# Patient Record
Sex: MALE | Race: WHITE | Employment: FULL TIME | URBAN - METROPOLITAN AREA
[De-identification: names, ages, dates, MRNs, and addresses within clinical notes are randomized per-mention and may not be internally consistent; named-entity substitution may affect disease eponyms.]

---

## 2018-02-07 ENCOUNTER — OFFICE VISIT (OUTPATIENT)
Dept: ORTHOPEDIC SURGERY | Age: 28
End: 2018-02-07

## 2018-02-07 VITALS
HEIGHT: 69 IN | SYSTOLIC BLOOD PRESSURE: 131 MMHG | RESPIRATION RATE: 16 BRPM | DIASTOLIC BLOOD PRESSURE: 81 MMHG | WEIGHT: 160 LBS | HEART RATE: 71 BPM | BODY MASS INDEX: 23.7 KG/M2

## 2018-02-07 DIAGNOSIS — S62.211A CLOSED BENNETT'S FRACTURE OF RIGHT THUMB, INITIAL ENCOUNTER: Primary | ICD-10-CM

## 2018-02-07 PROCEDURE — 99243 OFF/OP CNSLTJ NEW/EST LOW 30: CPT | Performed by: ORTHOPAEDIC SURGERY
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
## 2018-02-09 ENCOUNTER — PAT TELEPHONE (OUTPATIENT)
Dept: PREADMISSION TESTING | Age: 28
End: 2018-02-09

## 2018-02-09 VITALS — WEIGHT: 160 LBS | BODY MASS INDEX: 23.7 KG/M2 | HEIGHT: 69 IN

## 2018-02-09 NOTE — PRE-PROCEDURE INSTRUCTIONS
day of surgery. All jewelry must be removed. If you have dentures, they will be removed before going to operating room. For your convenience, we will provide you with a container. If you wear contact lenses or glasses, they will be removed, please bring a case for them. If you have a living will and a durable power of  for healthcare, please bring in a copy. As part of our patient safety program to minimize surgical site infections, we ask you to do the following:    · Please notify your surgeon if you develop any illness between         now and the  day of your surgery. · This includes a cough, cold, fever, sore throat, nausea,         or vomiting, and diarrhea, etc.  ·  Please notify your surgeon if you experience dizziness, shortness         of breath or blurred vision between now and the time of your surgery. Do not shave your operative site 96 hours prior to surgery. For face and neck surgery, men may use an electric razor 48 hours   prior to surgery. You may shower the night before surgery or the morning of   your surgery with an antibacterial soap. You will need to bring a photo ID and insurance card    [facility] has an onsite pharmacy, would you like to utilize our pharmacy     If you will be staying overnight and use a C-pap machine, please bring   your C-pap to hospital     Our goal is to provide you with excellent care, therefore, visitors will be limited to two(2) in the room at a time so that we may focus on providing this care for you. Please contact pre-admission testing if you have any further questions. [facility] phone number:  [phone] [facility] fax number:  [phone]  Please note these are generalized instructions for all surgical cases, you may be provided with more specific instructions according to your surgery.
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
## 2018-02-13 ENCOUNTER — HOSPITAL ENCOUNTER (OUTPATIENT)
Dept: SURGERY | Age: 28
Discharge: HOME | End: 2018-02-13
Attending: ORTHOPAEDIC SURGERY | Admitting: ORTHOPAEDIC SURGERY

## 2018-02-13 VITALS
SYSTOLIC BLOOD PRESSURE: 110 MMHG | RESPIRATION RATE: 16 BRPM | DIASTOLIC BLOOD PRESSURE: 57 MMHG | HEIGHT: 69 IN | WEIGHT: 160 LBS | BODY MASS INDEX: 23.7 KG/M2 | OXYGEN SATURATION: 97 % | HEART RATE: 56 BPM | TEMPERATURE: 97 F

## 2018-02-13 DIAGNOSIS — S62.211D CLOSED BENNETT'S FRACTURE OF RIGHT THUMB WITH ROUTINE HEALING, SUBSEQUENT ENCOUNTER: Primary | ICD-10-CM

## 2018-02-13 RX ORDER — FENTANYL CITRATE 50 UG/ML
25 INJECTION, SOLUTION INTRAMUSCULAR; INTRAVENOUS EVERY 5 MIN PRN
Status: DISCONTINUED | OUTPATIENT
Start: 2018-02-13 | End: 2018-02-14 | Stop reason: HOSPADM

## 2018-02-13 RX ORDER — OXYCODONE HYDROCHLORIDE AND ACETAMINOPHEN 5; 325 MG/1; MG/1
2 TABLET ORAL PRN
Status: COMPLETED | OUTPATIENT
Start: 2018-02-13 | End: 2018-02-13

## 2018-02-13 RX ORDER — MORPHINE SULFATE 2 MG/ML
2 INJECTION, SOLUTION INTRAMUSCULAR; INTRAVENOUS EVERY 5 MIN PRN
Status: DISCONTINUED | OUTPATIENT
Start: 2018-02-13 | End: 2018-02-14 | Stop reason: HOSPADM

## 2018-02-13 RX ORDER — FENTANYL CITRATE 50 UG/ML
50 INJECTION, SOLUTION INTRAMUSCULAR; INTRAVENOUS EVERY 5 MIN PRN
Status: DISCONTINUED | OUTPATIENT
Start: 2018-02-13 | End: 2018-02-14 | Stop reason: HOSPADM

## 2018-02-13 RX ORDER — MORPHINE SULFATE 2 MG/ML
1 INJECTION, SOLUTION INTRAMUSCULAR; INTRAVENOUS EVERY 5 MIN PRN
Status: DISCONTINUED | OUTPATIENT
Start: 2018-02-13 | End: 2018-02-14 | Stop reason: HOSPADM

## 2018-02-13 RX ORDER — HYDROCODONE BITARTRATE AND ACETAMINOPHEN 5; 325 MG/1; MG/1
1 TABLET ORAL EVERY 6 HOURS PRN
Qty: 20 TABLET | Refills: 0 | Status: SHIPPED | OUTPATIENT
Start: 2018-02-13 | End: 2018-02-20

## 2018-02-13 RX ORDER — OXYCODONE HYDROCHLORIDE AND ACETAMINOPHEN 5; 325 MG/1; MG/1
1 TABLET ORAL PRN
Status: COMPLETED | OUTPATIENT
Start: 2018-02-13 | End: 2018-02-13

## 2018-02-13 RX ORDER — MEPERIDINE HYDROCHLORIDE 25 MG/ML
12.5 INJECTION INTRAMUSCULAR; INTRAVENOUS; SUBCUTANEOUS EVERY 5 MIN PRN
Status: DISCONTINUED | OUTPATIENT
Start: 2018-02-13 | End: 2018-02-14 | Stop reason: HOSPADM

## 2018-02-13 RX ORDER — ONDANSETRON 2 MG/ML
4 INJECTION INTRAMUSCULAR; INTRAVENOUS
Status: ACTIVE | OUTPATIENT
Start: 2018-02-13 | End: 2018-02-13

## 2018-02-13 RX ORDER — SODIUM CHLORIDE 9 MG/ML
INJECTION, SOLUTION INTRAVENOUS CONTINUOUS
Status: DISCONTINUED | OUTPATIENT
Start: 2018-02-13 | End: 2018-02-14 | Stop reason: HOSPADM

## 2018-02-13 RX ORDER — SODIUM CHLORIDE 0.9 % (FLUSH) 0.9 %
10 SYRINGE (ML) INJECTION EVERY 12 HOURS SCHEDULED
Status: DISCONTINUED | OUTPATIENT
Start: 2018-02-13 | End: 2018-02-14 | Stop reason: HOSPADM

## 2018-02-13 RX ORDER — SODIUM CHLORIDE 0.9 % (FLUSH) 0.9 %
10 SYRINGE (ML) INJECTION PRN
Status: DISCONTINUED | OUTPATIENT
Start: 2018-02-13 | End: 2018-02-14 | Stop reason: HOSPADM

## 2018-02-13 RX ADMIN — SODIUM CHLORIDE: 9 INJECTION, SOLUTION INTRAVENOUS at 13:20

## 2018-02-13 RX ADMIN — FENTANYL CITRATE 25 MCG: 50 INJECTION, SOLUTION INTRAMUSCULAR; INTRAVENOUS at 15:26

## 2018-02-13 RX ADMIN — OXYCODONE HYDROCHLORIDE AND ACETAMINOPHEN 1 TABLET: 5; 325 TABLET ORAL at 16:24

## 2018-02-13 ASSESSMENT — PAIN DESCRIPTION - LOCATION
LOCATION: HAND
LOCATION: HAND

## 2018-02-13 ASSESSMENT — PAIN - FUNCTIONAL ASSESSMENT: PAIN_FUNCTIONAL_ASSESSMENT: 0-10

## 2018-02-13 ASSESSMENT — PAIN DESCRIPTION - ORIENTATION
ORIENTATION: RIGHT
ORIENTATION: RIGHT

## 2018-02-13 ASSESSMENT — PAIN SCALES - GENERAL
PAINLEVEL_OUTOF10: 8
PAINLEVEL_OUTOF10: 5
PAINLEVEL_OUTOF10: 8
PAINLEVEL_OUTOF10: 0
PAINLEVEL_OUTOF10: 6

## 2018-02-13 ASSESSMENT — PAIN DESCRIPTION - PAIN TYPE
TYPE: SURGICAL PAIN
TYPE: SURGICAL PAIN

## 2018-02-13 ASSESSMENT — LIFESTYLE VARIABLES: SMOKING_STATUS: 0

## 2018-02-13 NOTE — PROGRESS NOTES
Patient arouses to name, oral airway removed. Resp easy unlabored. VSS. IV patent. Right hand/thumb dressing unchanged. Patient drowsy and goes asleep easily.

## 2018-02-13 NOTE — PROGRESS NOTES
Patient awake and alert. VSS. Resp easy unlabored on room air O2 with SaO2 97%. Monitor in SB. Right hand/thumb dressing dry and intact with stable neurovascular checks to RUE. Patient denies C/O nausea and taking ice chips prn. Pain level 4-5 of 10 and tolerable. Patient stable to transfer to ACU for phase II. HOB up. Moving all extremities to command.

## 2018-02-13 NOTE — PROGRESS NOTES
Alert and oriented. C/o 8/10 surgical pain. Dressing remains clean dry intact. Fingers warm, starting to move, christelle well. Tolerated sitting up and po fluids and crackers well. Family at bedside. Understands discharge instructions.

## 2018-02-13 NOTE — ANESTHESIA PRE-OP
risks discussed with patient. Plan discussed with CRNA. This pre-anesthesia assessment may be used as a history and physical.    DOS STAFF ADDENDUM:    Pt seen and examined, chart reviewed (including anesthesia, drug and allergy history). No interval changes to history and physical examination. Anesthetic plan, risks, benefits, alternatives, and personnel involved discussed with patient. Patient verbalized an understanding and agrees to proceed.       Melinda Colby MD  February 13, 2018  12:46 PM      Melinda Colby MD   2/13/2018

## 2018-02-14 ENCOUNTER — TELEPHONE (OUTPATIENT)
Dept: ORTHOPEDIC SURGERY | Age: 28
End: 2018-02-14

## 2018-02-19 ENCOUNTER — OFFICE VISIT (OUTPATIENT)
Dept: ORTHOPEDIC SURGERY | Age: 28
End: 2018-02-19

## 2018-02-19 VITALS — WEIGHT: 160 LBS | BODY MASS INDEX: 23.7 KG/M2 | HEIGHT: 69 IN | RESPIRATION RATE: 16 BRPM

## 2018-02-19 DIAGNOSIS — S62.211D: ICD-10-CM

## 2018-02-19 DIAGNOSIS — M79.641 RIGHT HAND PAIN: Primary | ICD-10-CM

## 2018-02-19 DIAGNOSIS — S62.211D CLOSED BENNETT'S FRACTURE OF RIGHT THUMB WITH ROUTINE HEALING, SUBSEQUENT ENCOUNTER: ICD-10-CM

## 2018-02-19 PROCEDURE — 99024 POSTOP FOLLOW-UP VISIT: CPT | Performed by: PHYSICIAN ASSISTANT

## 2018-02-19 PROCEDURE — 29075 APPL CST ELBW FNGR SHORT ARM: CPT | Performed by: PHYSICIAN ASSISTANT

## 2018-02-19 NOTE — PROGRESS NOTES
Darcie Gaffney MD. ordered a spica cast  to be applied to [facility] right upper Hand. I applied a spica cast cast to [facility] right upper Hand. I applied 1 rolls of under padding in a 50/50 fashion. The Rhina Webstery requested black color fiberglass. I rolled 2 rolls of fiberglass in a 50/50 fashion. His right upper Hand is in a thumb spica position short arm fiberglass cast incorporating the Thumb in an intrinsic safe position Keren Rosas MD .  Cole Coombs's nail beds are pink in color, the extremity is warm to the touch. Capillary refill is less than 2 seconds. Rhina Webstery instructed on proper care of cast.  Do not get wet, keep all items out of cast.  If cast is painful please make appointment to get checked. Patient also briefed on circulation compromise. If digits are cold, blue, and tingling patient must must seek care. If after hours patient is to go to Emergency Room. During office hours patient must come in to office.

## 2018-02-21 ENCOUNTER — TELEPHONE (OUTPATIENT)
Dept: ORTHOPEDIC SURGERY | Age: 28
End: 2018-02-21

## 2018-03-12 ENCOUNTER — OFFICE VISIT (OUTPATIENT)
Dept: ORTHOPEDIC SURGERY | Age: 28
End: 2018-03-12

## 2018-03-12 VITALS — RESPIRATION RATE: 16 BRPM | BODY MASS INDEX: 23.7 KG/M2 | WEIGHT: 160 LBS | HEIGHT: 69 IN

## 2018-03-12 DIAGNOSIS — S62.211D: ICD-10-CM

## 2018-03-12 DIAGNOSIS — M79.641 RIGHT HAND PAIN: Primary | ICD-10-CM

## 2018-03-12 PROCEDURE — 99024 POSTOP FOLLOW-UP VISIT: CPT | Performed by: PHYSICIAN ASSISTANT

## 2018-03-12 NOTE — PROGRESS NOTES
Mr. Tonio March returns today in follow-up of his recent right Thumb Metacarpal  Fracture Repair which was done 4 weeks ago. He has noted decreased discomfort and decreased swelling. He notes no symptoms of numbness, tingling, no symptoms related to perfusion. Physical Exam:  Skin incisions & pin sites are healing well, no significant drainage, no dehiscence. Digital range of motion is not assessed due to the presence of the recently-healed fracture. Wrist range of motion is somewhat stiff from immobilization. Sensation is normal in the Whole Hand. Vascular examination reveals normal, good capillary refill and good color. Swelling is minimal.  Clinical alignment and rotation are normal.  Fracture site shows no tenderness to palpation. Radiographic Evaluation:  Radiographs were obtained today (3 views of the right hand). They demonstrate excellent maintenance of alignment of the fracture fragments, no rotational deformity, & moderate amount of interval healing of the fracture. There has not been evidence of hardware loosening or failure. The fracture does appear to be healed at this time. Impression:  Mr. Tonio March is doing well after recent right Thumb Metacarpal Fracture Repair. It would appear that he has fully healed his fracture. Plan:  Mr. Tonio March has healed his fracture at this time. His Pecutaneous Pins were removed today without difficulty. With his consent, the area surrounding the pins was sterily prepped and the pins were carefully removed. There was minimal bleeding which was easily controlled. A dry sterile dressing was applied, He tolerated the procedure without difficulty. Mr. Tonio March was given instructions regarding pin site care and maintenance. He is today is released from his immobilization & provided a protective splint and instructions on the appropriate care of, wear, and use of this device.    He is also instructed in  work on Active & Passive range of motion of the digits, wrist, & elbow. These modalities were demonstrated to him today. We discussed the gradual return to use of the injured hand & wrist and the cautious resumption of activities. We discussed the option of pursuing formalized hand therapy and a prescription  was not indicated. I have asked Mr. Elysia Del Real to follow-up with me in approximately 4 weeks from now for re-evaluation, unless he has regained full and painless range of motion and use of the injured hand. He is also specifically instructed to return to the office or call for an appointment sooner if his symptoms are changing or worsening prior to that time.

## 2018-03-28 ENCOUNTER — OFFICE VISIT (OUTPATIENT)
Dept: ORTHOPEDIC SURGERY | Age: 28
End: 2018-03-28

## 2018-03-28 VITALS — BODY MASS INDEX: 23.7 KG/M2 | WEIGHT: 160 LBS | HEIGHT: 69 IN | RESPIRATION RATE: 16 BRPM

## 2018-03-28 DIAGNOSIS — S62.211D: Primary | ICD-10-CM

## 2018-03-28 PROCEDURE — 99024 POSTOP FOLLOW-UP VISIT: CPT | Performed by: PHYSICIAN ASSISTANT

## 2018-04-11 ENCOUNTER — TELEPHONE (OUTPATIENT)
Dept: ORTHOPEDIC SURGERY | Age: 28
End: 2018-04-11

## 2018-04-11 ENCOUNTER — HOSPITAL ENCOUNTER (OUTPATIENT)
Dept: OCCUPATIONAL THERAPY | Age: 28
Discharge: HOME | End: 2018-04-30
Attending: PHYSICIAN ASSISTANT | Admitting: PHYSICIAN ASSISTANT

## 2018-05-01 ENCOUNTER — HOSPITAL ENCOUNTER (OUTPATIENT)
Dept: OTHER | Age: 28
Discharge: HOME | End: 2018-05-31
Attending: PHYSICIAN ASSISTANT | Admitting: PHYSICIAN ASSISTANT

## 2018-06-18 ENCOUNTER — OFFICE VISIT (OUTPATIENT)
Dept: ORTHOPEDIC SURGERY | Age: 28
End: 2018-06-18

## 2018-06-18 VITALS
HEART RATE: 71 BPM | BODY MASS INDEX: 23.7 KG/M2 | HEIGHT: 69 IN | RESPIRATION RATE: 16 BRPM | SYSTOLIC BLOOD PRESSURE: 122 MMHG | DIASTOLIC BLOOD PRESSURE: 69 MMHG | WEIGHT: 160 LBS

## 2018-06-18 DIAGNOSIS — S62.211D: Primary | ICD-10-CM

## 2018-06-18 PROCEDURE — 99213 OFFICE O/P EST LOW 20 MIN: CPT | Performed by: ORTHOPAEDIC SURGERY

## 2019-01-14 ENCOUNTER — OFFICE VISIT (OUTPATIENT)
Dept: INTERNAL MEDICINE CLINIC | Age: 29
End: 2019-01-14
Payer: COMMERCIAL

## 2019-01-14 VITALS
WEIGHT: 171.2 LBS | BODY MASS INDEX: 33.61 KG/M2 | DIASTOLIC BLOOD PRESSURE: 68 MMHG | SYSTOLIC BLOOD PRESSURE: 124 MMHG | RESPIRATION RATE: 10 BRPM | HEIGHT: 60 IN | HEART RATE: 82 BPM

## 2019-01-14 DIAGNOSIS — R53.83 OTHER FATIGUE: ICD-10-CM

## 2019-01-14 DIAGNOSIS — Z92.241 HISTORY OF ANABOLIC STEROID USE: ICD-10-CM

## 2019-01-14 DIAGNOSIS — N62 GYNECOMASTIA: Primary | ICD-10-CM

## 2019-01-14 DIAGNOSIS — N50.89 MASS OF LEFT TESTICLE: ICD-10-CM

## 2019-01-14 PROCEDURE — 99203 OFFICE O/P NEW LOW 30 MIN: CPT | Performed by: INTERNAL MEDICINE

## 2019-01-14 ASSESSMENT — PATIENT HEALTH QUESTIONNAIRE - PHQ9
2. FEELING DOWN, DEPRESSED OR HOPELESS: 0
SUM OF ALL RESPONSES TO PHQ9 QUESTIONS 1 & 2: 0
1. LITTLE INTEREST OR PLEASURE IN DOING THINGS: 0
SUM OF ALL RESPONSES TO PHQ QUESTIONS 1-9: 0
2. FEELING DOWN, DEPRESSED OR HOPELESS: 0
SUM OF ALL RESPONSES TO PHQ QUESTIONS 1-9: 0

## 2019-01-14 ASSESSMENT — ENCOUNTER SYMPTOMS
DIARRHEA: 0
CHEST TIGHTNESS: 0
SORE THROAT: 0
CONSTIPATION: 0
SHORTNESS OF BREATH: 0

## 2019-01-24 ENCOUNTER — HOSPITAL ENCOUNTER (OUTPATIENT)
Age: 29
Discharge: HOME | End: 2019-01-24
Payer: COMMERCIAL

## 2019-01-24 DIAGNOSIS — Z92.241 HISTORY OF ANABOLIC STEROID USE: ICD-10-CM

## 2019-01-24 LAB
A/G RATIO: 1.9 (ref 1.1–2.2)
ALBUMIN SERPL-MCNC: 4.7 G/DL (ref 3.4–5)
ALP BLD-CCNC: 50 U/L (ref 40–129)
ALT SERPL-CCNC: 27 U/L (ref 10–40)
ANION GAP SERPL CALCULATED.3IONS-SCNC: 15 MMOL/L (ref 3–16)
AST SERPL-CCNC: 34 U/L (ref 15–37)
BASOPHILS ABSOLUTE: 0.1 K/UL (ref 0–0.2)
BASOPHILS RELATIVE PERCENT: 0.9 %
BILIRUB SERPL-MCNC: 1.2 MG/DL (ref 0–1)
BUN BLDV-MCNC: 27 MG/DL (ref 7–20)
CALCIUM SERPL-MCNC: 9.8 MG/DL (ref 8.3–10.6)
CHLORIDE BLD-SCNC: 102 MMOL/L (ref 99–110)
CHOLESTEROL, TOTAL: 152 MG/DL (ref 0–199)
CO2: 24 MMOL/L (ref 21–32)
CREAT SERPL-MCNC: 1.2 MG/DL (ref 0.9–1.3)
EOSINOPHILS ABSOLUTE: 0.7 K/UL (ref 0–0.6)
EOSINOPHILS RELATIVE PERCENT: 9.1 %
FOLLICLE STIMULATING HORMONE: 17.5 MIU/ML
GFR AFRICAN AMERICAN: >60
GFR NON-AFRICAN AMERICAN: >60
GLOBULIN: 2.5 G/DL
GLUCOSE BLD-MCNC: 90 MG/DL (ref 70–99)
HCT VFR BLD CALC: 41.6 % (ref 40.5–52.5)
HDLC SERPL-MCNC: 47 MG/DL (ref 40–60)
HEMOGLOBIN: 14 G/DL (ref 13.5–17.5)
LDL CHOLESTEROL CALCULATED: 92 MG/DL
LUTEINIZING HORMONE: 5.9 MIU/ML
LYMPHOCYTES ABSOLUTE: 3 K/UL (ref 1–5.1)
LYMPHOCYTES RELATIVE PERCENT: 41.9 %
MCH RBC QN AUTO: 31.2 PG (ref 26–34)
MCHC RBC AUTO-ENTMCNC: 33.6 G/DL (ref 31–36)
MCV RBC AUTO: 92.8 FL (ref 80–100)
MONOCYTES ABSOLUTE: 0.6 K/UL (ref 0–1.3)
MONOCYTES RELATIVE PERCENT: 8.1 %
NEUTROPHILS ABSOLUTE: 2.9 K/UL (ref 1.7–7.7)
NEUTROPHILS RELATIVE PERCENT: 40 %
PDW BLD-RTO: 13.7 % (ref 12.4–15.4)
PLATELET # BLD: 312 K/UL (ref 135–450)
PMV BLD AUTO: 8.2 FL (ref 5–10.5)
POTASSIUM SERPL-SCNC: 4.2 MMOL/L (ref 3.5–5.1)
RBC # BLD: 4.48 M/UL (ref 4.2–5.9)
SODIUM BLD-SCNC: 141 MMOL/L (ref 136–145)
TOTAL PROTEIN: 7.2 G/DL (ref 6.4–8.2)
TRIGL SERPL-MCNC: 63 MG/DL (ref 0–150)
TSH REFLEX: 3.5 UIU/ML (ref 0.27–4.2)
VLDLC SERPL CALC-MCNC: 13 MG/DL
WBC # BLD: 7.2 K/UL (ref 4–11)

## 2019-01-24 PROCEDURE — 80053 COMPREHEN METABOLIC PANEL: CPT

## 2019-01-24 PROCEDURE — 84403 ASSAY OF TOTAL TESTOSTERONE: CPT

## 2019-01-24 PROCEDURE — 36415 COLL VENOUS BLD VENIPUNCTURE: CPT

## 2019-01-24 PROCEDURE — 84443 ASSAY THYROID STIM HORMONE: CPT

## 2019-01-24 PROCEDURE — 83001 ASSAY OF GONADOTROPIN (FSH): CPT

## 2019-01-24 PROCEDURE — 85025 COMPLETE CBC W/AUTO DIFF WBC: CPT

## 2019-01-24 PROCEDURE — 80061 LIPID PANEL: CPT

## 2019-01-24 PROCEDURE — 83002 ASSAY OF GONADOTROPIN (LH): CPT

## 2019-01-24 PROCEDURE — 84270 ASSAY OF SEX HORMONE GLOBUL: CPT

## 2019-01-29 ENCOUNTER — HOSPITAL ENCOUNTER (OUTPATIENT)
Dept: ULTRASOUND IMAGING | Age: 29
Discharge: HOME | End: 2019-01-29
Payer: COMMERCIAL

## 2019-01-29 ENCOUNTER — HOSPITAL ENCOUNTER (OUTPATIENT)
Dept: WOMENS IMAGING | Age: 29
Discharge: HOME | End: 2019-01-29
Payer: COMMERCIAL

## 2019-01-29 DIAGNOSIS — Z92.241 HISTORY OF ANABOLIC STEROID USE: ICD-10-CM

## 2019-01-29 DIAGNOSIS — N62 GYNECOMASTIA: ICD-10-CM

## 2019-01-29 DIAGNOSIS — N50.89 MASS OF LEFT TESTICLE: ICD-10-CM

## 2019-01-29 LAB
SEX HORMONE BINDING GLOBULIN: 53 NMOL/L (ref 11–80)
TESTOSTERONE FREE-NONMALE: 87.2 PG/ML (ref 47–244)
TESTOSTERONE TOTAL: 559 NG/DL (ref 220–1000)

## 2019-01-29 PROCEDURE — G0279 TOMOSYNTHESIS, MAMMO: HCPCS

## 2019-01-29 PROCEDURE — 76642 ULTRASOUND BREAST LIMITED: CPT

## 2019-01-29 PROCEDURE — 76870 US EXAM SCROTUM: CPT

## 2019-05-29 ENCOUNTER — APPOINTMENT (OUTPATIENT)
Dept: GENERAL RADIOLOGY | Age: 29
End: 2019-05-29
Payer: COMMERCIAL

## 2019-05-29 ENCOUNTER — OFFICE VISIT (OUTPATIENT)
Dept: ORTHOPEDIC SURGERY | Age: 29
End: 2019-05-29
Payer: COMMERCIAL

## 2019-05-29 ENCOUNTER — HOSPITAL ENCOUNTER (EMERGENCY)
Age: 29
Discharge: HOME | End: 2019-05-29
Attending: EMERGENCY MEDICINE
Payer: COMMERCIAL

## 2019-05-29 VITALS
DIASTOLIC BLOOD PRESSURE: 72 MMHG | WEIGHT: 160 LBS | RESPIRATION RATE: 16 BRPM | HEART RATE: 68 BPM | BODY MASS INDEX: 23.7 KG/M2 | SYSTOLIC BLOOD PRESSURE: 123 MMHG | HEIGHT: 69 IN

## 2019-05-29 VITALS
HEART RATE: 68 BPM | OXYGEN SATURATION: 98 % | BODY MASS INDEX: 25.18 KG/M2 | DIASTOLIC BLOOD PRESSURE: 43 MMHG | HEIGHT: 69 IN | WEIGHT: 170 LBS | TEMPERATURE: 99 F | SYSTOLIC BLOOD PRESSURE: 131 MMHG | RESPIRATION RATE: 16 BRPM

## 2019-05-29 DIAGNOSIS — M79.641 HAND PAIN, RIGHT: Primary | ICD-10-CM

## 2019-05-29 DIAGNOSIS — S62.319A FRACTURE OF METACARPAL BASE OF RIGHT HAND, CLOSED, INITIAL ENCOUNTER: ICD-10-CM

## 2019-05-29 DIAGNOSIS — R07.89 POSTERIOR CHEST PAIN: ICD-10-CM

## 2019-05-29 DIAGNOSIS — R07.89 LEFT-SIDED CHEST WALL PAIN: Primary | ICD-10-CM

## 2019-05-29 LAB
A/G RATIO: 1.8 (ref 1.1–2.2)
ALBUMIN SERPL-MCNC: 4.8 G/DL (ref 3.4–5)
ALP BLD-CCNC: 65 U/L (ref 40–129)
ALT SERPL-CCNC: 26 U/L (ref 10–40)
ANION GAP SERPL CALCULATED.3IONS-SCNC: 9 MMOL/L (ref 3–16)
AST SERPL-CCNC: 30 U/L (ref 15–37)
BASOPHILS ABSOLUTE: 0.1 K/UL (ref 0–0.2)
BASOPHILS RELATIVE PERCENT: 0.8 %
BILIRUB SERPL-MCNC: 0.4 MG/DL (ref 0–1)
BUN BLDV-MCNC: 30 MG/DL (ref 7–20)
CALCIUM SERPL-MCNC: 10 MG/DL (ref 8.3–10.6)
CHLORIDE BLD-SCNC: 101 MMOL/L (ref 99–110)
CO2: 29 MMOL/L (ref 21–32)
CREAT SERPL-MCNC: 1.3 MG/DL (ref 0.9–1.3)
EOSINOPHILS ABSOLUTE: 0.4 K/UL (ref 0–0.6)
EOSINOPHILS RELATIVE PERCENT: 4.8 %
GFR AFRICAN AMERICAN: >60
GFR NON-AFRICAN AMERICAN: >60
GLOBULIN: 2.7 G/DL
GLUCOSE BLD-MCNC: 89 MG/DL (ref 70–99)
HCT VFR BLD CALC: 47.1 % (ref 40.5–52.5)
HEMOGLOBIN: 16.1 G/DL (ref 13.5–17.5)
LYMPHOCYTES ABSOLUTE: 2 K/UL (ref 1–5.1)
LYMPHOCYTES RELATIVE PERCENT: 23 %
MCH RBC QN AUTO: 30.8 PG (ref 26–34)
MCHC RBC AUTO-ENTMCNC: 34.2 G/DL (ref 31–36)
MCV RBC AUTO: 90 FL (ref 80–100)
MONOCYTES ABSOLUTE: 0.6 K/UL (ref 0–1.3)
MONOCYTES RELATIVE PERCENT: 6.3 %
NEUTROPHILS ABSOLUTE: 5.7 K/UL (ref 1.7–7.7)
NEUTROPHILS RELATIVE PERCENT: 65.1 %
PDW BLD-RTO: 13.3 % (ref 12.4–15.4)
PLATELET # BLD: 269 K/UL (ref 135–450)
PMV BLD AUTO: 7.6 FL (ref 5–10.5)
POTASSIUM SERPL-SCNC: 3.9 MMOL/L (ref 3.5–5.1)
RBC # BLD: 5.24 M/UL (ref 4.2–5.9)
SODIUM BLD-SCNC: 139 MMOL/L (ref 136–145)
TOTAL PROTEIN: 7.5 G/DL (ref 6.4–8.2)
WBC # BLD: 8.8 K/UL (ref 4–11)

## 2019-05-29 PROCEDURE — 71046 X-RAY EXAM CHEST 2 VIEWS: CPT

## 2019-05-29 PROCEDURE — 80053 COMPREHEN METABOLIC PANEL: CPT

## 2019-05-29 PROCEDURE — 1036F TOBACCO NON-USER: CPT | Performed by: ORTHOPAEDIC SURGERY

## 2019-05-29 PROCEDURE — G8427 DOCREV CUR MEDS BY ELIG CLIN: HCPCS | Performed by: ORTHOPAEDIC SURGERY

## 2019-05-29 PROCEDURE — G8417 CALC BMI ABV UP PARAM F/U: HCPCS | Performed by: ORTHOPAEDIC SURGERY

## 2019-05-29 PROCEDURE — 99214 OFFICE O/P EST MOD 30 MIN: CPT | Performed by: ORTHOPAEDIC SURGERY

## 2019-05-29 PROCEDURE — 85025 COMPLETE CBC W/AUTO DIFF WBC: CPT

## 2019-05-29 PROCEDURE — 26600 TREAT METACARPAL FRACTURE: CPT | Performed by: ORTHOPAEDIC SURGERY

## 2019-05-29 PROCEDURE — L3908 WHO COCK-UP NONMOLDE PRE OTS: HCPCS | Performed by: ORTHOPAEDIC SURGERY

## 2019-05-29 PROCEDURE — 6370000000 HC RX 637 (ALT 250 FOR IP): Performed by: EMERGENCY MEDICINE

## 2019-05-29 PROCEDURE — 93005 ELECTROCARDIOGRAM TRACING: CPT | Performed by: EMERGENCY MEDICINE

## 2019-05-29 PROCEDURE — 99283 EMERGENCY DEPT VISIT LOW MDM: CPT

## 2019-05-29 RX ORDER — CYCLOBENZAPRINE HCL 10 MG
10 TABLET ORAL ONCE
Status: COMPLETED | OUTPATIENT
Start: 2019-05-29 | End: 2019-05-29

## 2019-05-29 RX ORDER — HYDROCODONE BITARTRATE AND ACETAMINOPHEN 5; 325 MG/1; MG/1
1 TABLET ORAL EVERY 6 HOURS PRN
Qty: 12 TABLET | Refills: 0 | Status: SHIPPED | OUTPATIENT
Start: 2019-05-29 | End: 2019-06-01

## 2019-05-29 RX ORDER — METHOCARBAMOL 750 MG/1
750-1500 TABLET, FILM COATED ORAL EVERY 8 HOURS PRN
Qty: 40 TABLET | Refills: 0 | Status: SHIPPED | OUTPATIENT
Start: 2019-05-29 | End: 2019-06-08

## 2019-05-29 RX ORDER — HYDROCODONE BITARTRATE AND ACETAMINOPHEN 5; 325 MG/1; MG/1
1 TABLET ORAL ONCE
Status: COMPLETED | OUTPATIENT
Start: 2019-05-29 | End: 2019-05-29

## 2019-05-29 RX ADMIN — HYDROCODONE BITARTRATE AND ACETAMINOPHEN 1 TABLET: 5; 325 TABLET ORAL at 23:01

## 2019-05-29 RX ADMIN — CYCLOBENZAPRINE HYDROCHLORIDE 10 MG: 10 TABLET, FILM COATED ORAL at 23:01

## 2019-05-29 ASSESSMENT — PAIN SCALES - GENERAL: PAINLEVEL_OUTOF10: 7

## 2019-05-29 NOTE — LETTER
[address] [employer] [address]  Phone: [phone]  Fax: [phone]    Princess Babinski, MD        May 29, 2019     Patient: Sada Escudero   YOB: 1990   Date of Visit: 5/29/2019       To Whom it May Concern:    Sada Escudero was seen in my clinic on 5/29/2019. If you have any questions or concerns, please don't hesitate to call.     Sincerely,               Princess Babinski, MD

## 2019-05-29 NOTE — Clinical Note
Dear  Xiao Hallman, DO,Thank you very much for your referral or Mr. Luis M Nava to me for evaluation and treatment of his Hand & Wrist condition. I appreciate your confidence in me and thank you for allowing me the opportunity to care for your patients. If I can be of any further assistance to you on this or any other patient, please do not hesitate to contact me. Sincerely,Aron Martinez MD

## 2019-05-30 LAB
EKG ATRIAL RATE: 67 BPM
EKG DIAGNOSIS: NORMAL
EKG P AXIS: 63 DEGREES
EKG P-R INTERVAL: 128 MS
EKG Q-T INTERVAL: 380 MS
EKG QRS DURATION: 90 MS
EKG QTC CALCULATION (BAZETT): 401 MS
EKG R AXIS: 86 DEGREES
EKG T AXIS: 42 DEGREES
EKG VENTRICULAR RATE: 67 BPM

## 2019-05-30 PROCEDURE — 93010 ELECTROCARDIOGRAM REPORT: CPT | Performed by: INTERNAL MEDICINE

## 2019-05-30 NOTE — PROGRESS NOTES
radius & ulna are not tender to palpation. There is a 0 degree angular deformity and no clinical evidence of  mal-rotation. There is no instability, pain or crepitance at the site of prior treatment for right Thumb metacarpal base fracture from 2/2018    Radiographic Evaluation:  Radiographs are reviewed  today (3 views of the right hand). They demonstrate evidence of an acute fracture of the proximal  Index Finger Metacarpal  with no comminution, 0 degrees of angular deformity and no  mal-rotation. There is not evidence of other injury or bony fracture. Impression:  Mr. Daisha Vásquez has sustained recent metacarpal fracture, non-displaced and presents requesting further treatment. Plan:  I have discussed with Mr. Daisha Vásquez the various treatment options for treatment of left Index Finger metacarpal fracture. We discussed the options of Closed treatment in situ, attempted closed reduction & cast immobilization, and surgical treatments (Open Reduction & Internal Fixation or Closed Reduction & Percutaneous Pinning of the fracture). he has elected to proceed conservativly, voicing an understanding of the other options available to him. I have explained the complications, limitations, expectations, alternatives, & risks of his chosen treatment. We discussed the possibility of residual symptoms as may be related to closed treatment of fractures including the possiblities of: mal-union, non-union, delayed union, persistant deformity, persistant pain, limitation of motion, future arthritic symptoms & the possible need for further treatment. He understood our discussion and was comfortable with his decision; he was provided with appropriate expectations. He is today fitted with a carefully applied, well padded & appropriately molded forearm based orthosis as he declined to be casted today. He was given a Patient Instruction Sheet related to cast care.       I have asked him to schedule a follow-up appointment for 4 weeks from now at which time we will obtain repeat radiographs of the hand out of splint/cast.    He is specifically instructed to contact the office between now & his scheduled appointment if he has concerns related to the cast or the underlying fracture. He is welcome to call for an appointment sooner if he has any additional concerns or questions.

## 2019-05-30 NOTE — PATIENT INSTRUCTIONS
Thank you for choosing [facility]icians for your Hand and Upper Extremity needs. If we can be of any further assistance to you, please do not hesitate to contact us.     Office Phone Number:  [phone]  or  [phone]
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
## 2019-07-02 ENCOUNTER — HOSPITAL ENCOUNTER (EMERGENCY)
Age: 29
Discharge: HOME | End: 2019-07-02
Payer: COMMERCIAL

## 2019-07-02 ENCOUNTER — APPOINTMENT (OUTPATIENT)
Dept: GENERAL RADIOLOGY | Age: 29
End: 2019-07-02
Payer: COMMERCIAL

## 2019-07-02 VITALS
RESPIRATION RATE: 18 BRPM | TEMPERATURE: 98.3 F | HEART RATE: 64 BPM | SYSTOLIC BLOOD PRESSURE: 140 MMHG | WEIGHT: 170 LBS | OXYGEN SATURATION: 98 % | DIASTOLIC BLOOD PRESSURE: 90 MMHG | BODY MASS INDEX: 25.18 KG/M2 | HEIGHT: 69 IN

## 2019-07-02 DIAGNOSIS — M25.571 ACUTE RIGHT ANKLE PAIN: Primary | ICD-10-CM

## 2019-07-02 PROCEDURE — 73610 X-RAY EXAM OF ANKLE: CPT

## 2019-07-02 PROCEDURE — 99283 EMERGENCY DEPT VISIT LOW MDM: CPT

## 2019-07-02 RX ORDER — NAPROXEN 500 MG/1
500 TABLET ORAL 2 TIMES DAILY
Qty: 20 TABLET | Refills: 0 | Status: SHIPPED | OUTPATIENT
Start: 2019-07-02 | End: 2022-04-26 | Stop reason: ALTCHOICE

## 2019-07-02 ASSESSMENT — PAIN DESCRIPTION - ORIENTATION: ORIENTATION: RIGHT

## 2019-07-02 ASSESSMENT — PAIN SCALES - GENERAL: PAINLEVEL_OUTOF10: 10

## 2019-07-02 ASSESSMENT — PAIN DESCRIPTION - LOCATION: LOCATION: ANKLE

## 2019-07-02 ASSESSMENT — PAIN DESCRIPTION - PAIN TYPE: TYPE: ACUTE PAIN

## 2019-07-02 NOTE — LETTER
[facility] Emergency Department  [address] [facility] [address]  Phone: [phone]               July 2, 2019    Patient: Dipti Acosta   YOB: 1990   Date of Visit: 7/2/2019       To Whom It May Concern:    Dipti Acosta was seen and treated in our emergency department on 7/2/2019.  He can return to work on 7/6/19    Sincerely,       Princess Mack PA-C         Signature:__________________________________

## 2019-07-02 NOTE — ED PROVIDER NOTES
12:56 pm COMPARISON: None. HISTORY: ORDERING SYSTEM PROVIDED HISTORY: injury TECHNOLOGIST PROVIDED HISTORY: Reason for exam:->injury Ordering Physician Provided Reason for Exam: Ankle Injury (pt twisted right ankle last night) Acuity: Acute Type of Exam: Initial FINDINGS: There is moderate soft tissue swelling laterally . The osseous structures and joint spaces are intact without evidence of fracture, malalignment or bone destruction. There is no radiopaque foreign body. Soft tissue swelling without osseous abnormality         MEDICAL DECISION MAKING / ED COURSE:      PROCEDURES:   Procedures    None    Patient was given:  Medications - No data to display    Patient presented with right ankle pain. X-ray imaging is unremarkable. Achilles function is intact. Suspect ankle sprain. Is that she can rest, ice, elevation and symptom medic treatment at home. No evidence of arterial occlusion, compartment syndrome, septic arthritis, cellulitis or other emergent etiology. Will follow up with orthopedics as needed. Return here for any worsening of symptoms or problems at home. The patient tolerated their visit well. I evaluated the patient. The physician was available for consultation as needed. The patient and / or the family were informed of the results of anytests, a time was given to answer questions, a plan was proposed and they agreed with plan. CLINICAL IMPRESSION:  1.  Acute right ankle pain        DISPOSITION Decision To Discharge 07/02/2019 01:28:11 PM      PATIENT REFERRED TO:  Carlito Shore [address], [address]  [place]le Romero  [phone]    Schedule an appointment as soon as possible for a visit   5-7 days, As needed    [facility] Emergency Department  [address] [place] [address]  [phone]    As needed      DISCHARGE MEDICATIONS:  Discharge Medication List as of 7/2/2019  1:28 PM      START taking these medications    Details   naproxen (NAPROSYN)

## 2019-07-31 ENCOUNTER — APPOINTMENT (OUTPATIENT)
Dept: GENERAL RADIOLOGY | Age: 29
End: 2019-07-31
Payer: COMMERCIAL

## 2019-07-31 ENCOUNTER — HOSPITAL ENCOUNTER (EMERGENCY)
Age: 29
Discharge: HOME | End: 2019-07-31
Attending: EMERGENCY MEDICINE
Payer: COMMERCIAL

## 2019-07-31 VITALS
RESPIRATION RATE: 18 BRPM | OXYGEN SATURATION: 100 % | BODY MASS INDEX: 25.48 KG/M2 | HEART RATE: 76 BPM | TEMPERATURE: 98.3 F | HEIGHT: 69 IN | DIASTOLIC BLOOD PRESSURE: 80 MMHG | SYSTOLIC BLOOD PRESSURE: 129 MMHG | WEIGHT: 172 LBS

## 2019-07-31 DIAGNOSIS — S63.502A SPRAIN OF LEFT WRIST, INITIAL ENCOUNTER: Primary | ICD-10-CM

## 2019-07-31 PROCEDURE — 73110 X-RAY EXAM OF WRIST: CPT

## 2019-07-31 PROCEDURE — 99283 EMERGENCY DEPT VISIT LOW MDM: CPT

## 2019-07-31 ASSESSMENT — PAIN DESCRIPTION - LOCATION: LOCATION: WRIST

## 2019-07-31 ASSESSMENT — PAIN SCALES - GENERAL: PAINLEVEL_OUTOF10: 8

## 2019-07-31 ASSESSMENT — PAIN DESCRIPTION - PAIN TYPE: TYPE: ACUTE PAIN

## 2019-07-31 ASSESSMENT — PAIN DESCRIPTION - ORIENTATION: ORIENTATION: LEFT

## 2019-07-31 NOTE — ED PROVIDER NOTES
Patient appeared well-developed, well-nourished, vital signs reviewed. MENTAL STATUS: Patient is awake and alert   HEAD AND FACE :Head is normal cephalic. Face normal appearance  EYES: eyes lids and conjunctiva appear normal. Pupils reactive  ENT :ears and nose appear normal externally. CV: Heart regular rate and rhythm without murmur  LUNGS: Lungs are clear to auscultation   CHEST WALL: normal appearance. normal motion  EXTREMITIES: Extremities moves all 4 Extremities without any weakness  Left wrist has no effusion there is no gross ligamentous laxity circulation sensation intact to the extremity  PSYCHIATRIC:mood and affect normal    NEUROLOGIC: no weakness or numbness noted,  no meningeal signs      This is a computerized dictation. I have made every effort to proofread this chart, however, transcription errors may exist.     DIAGNOSTIC RESULTS         Interpretation per the Radiologist below, if available at the time of this note:    XR WRIST LEFT (MIN 3 VIEWS)    (Results Pending)           LABS:  Labs Reviewed - No data to display    All other labs were within normal range or not returned as of this dictation. EMERGENCY DEPARTMENT COURSE and DIFFERENTIAL DIAGNOSIS/MDM:   Vitals:    Vitals:    07/31/19 0331   BP: 129/80   Pulse: 76   Resp: 18   Temp: 98.3 °F (36.8 °C)   SpO2: 100%   Weight: 172 lb (78 kg)   Height: 5' 9\" (1.753 m)           MDM    X-rays are negative patient presents with sprain left wrist will place an Ace wrap follow-up primary care doctor in 1 week's time    REASSESSMENT        CONSULTS:  None    Procedures:  Unless otherwise noted below, none     Procedures    FINAL IMPRESSION      Left wrist sprain  No diagnosis found. DISPOSITION/PLAN   DISPOSITION        PATIENT REFERRED TO:  No follow-up provider specified.     DISCHARGE MEDICATIONS:  New Prescriptions    No medications on file          (Please note that portions of this note were completed with a voice recognition program.

## 2020-01-08 ENCOUNTER — OFFICE VISIT (OUTPATIENT)
Dept: ORTHOPEDIC SURGERY | Age: 30
End: 2020-01-08

## 2020-01-08 VITALS — HEIGHT: 69 IN | RESPIRATION RATE: 16 BRPM | BODY MASS INDEX: 25.18 KG/M2 | WEIGHT: 170 LBS

## 2020-01-08 PROCEDURE — 99213 OFFICE O/P EST LOW 20 MIN: CPT | Performed by: ORTHOPAEDIC SURGERY

## 2020-01-09 NOTE — PATIENT INSTRUCTIONS
Thank you for choosing [facility]) Physicians for your Hand and Upper Extremity needs. If we can be of any further assistance to you, please do not hesitate to contact us.     Office Phone Number:  [phone]  or  [phone]

## 2020-01-09 NOTE — PROGRESS NOTES
the symptoms with which he presents. He has understood that in order to achieve more durable relief of his symptoms and to prevent future worsening or further damage, that definitive surgical treatment would be an option. Mr. Carlos Ruiz.  voiced an appropriate understanding of our discussion, the options available to him, and of the expectations of his selected  treatment. I have asked Mr. Carlos Ruiz.  to feel free to contact me or schedule a follow-up appointment at any time that he feels the need for any further evaluation or treatment for his upper extremitiy condition. If he feels that he continues to be feeling and functioning well, he may choose not to seek any further follow-up or treatment at his discretion. I will remain available to continue his care at any time in the future.

## 2020-03-02 ENCOUNTER — HOSPITAL ENCOUNTER (EMERGENCY)
Age: 30
Discharge: HOME | End: 2020-03-02
Attending: EMERGENCY MEDICINE

## 2020-03-02 VITALS
TEMPERATURE: 98.8 F | SYSTOLIC BLOOD PRESSURE: 141 MMHG | DIASTOLIC BLOOD PRESSURE: 93 MMHG | WEIGHT: 169 LBS | RESPIRATION RATE: 16 BRPM | HEART RATE: 76 BPM | OXYGEN SATURATION: 98 % | HEIGHT: 69 IN | BODY MASS INDEX: 25.03 KG/M2

## 2020-03-02 PROCEDURE — 6370000000 HC RX 637 (ALT 250 FOR IP): Performed by: EMERGENCY MEDICINE

## 2020-03-02 PROCEDURE — 99282 EMERGENCY DEPT VISIT SF MDM: CPT

## 2020-03-02 RX ORDER — IBUPROFEN 200 MG
400 TABLET ORAL ONCE
Status: COMPLETED | OUTPATIENT
Start: 2020-03-02 | End: 2020-03-02

## 2020-03-02 RX ADMIN — IBUPROFEN 400 MG: 200 TABLET, FILM COATED ORAL at 02:58

## 2020-03-02 ASSESSMENT — PAIN SCALES - GENERAL
PAINLEVEL_OUTOF10: 10
PAINLEVEL_OUTOF10: 8

## 2020-03-02 ASSESSMENT — PAIN DESCRIPTION - LOCATION: LOCATION: EAR

## 2020-03-02 ASSESSMENT — PAIN DESCRIPTION - ORIENTATION: ORIENTATION: LEFT

## 2020-03-02 ASSESSMENT — PAIN DESCRIPTION - PAIN TYPE: TYPE: ACUTE PAIN

## 2020-03-02 NOTE — ED PROVIDER NOTES
eMERGENCY dEPARTMENT eNCOUnter        [address] [facility]    Chief Complaint   Patient presents with    Otalgia     Pt states that he blew nose at 830 and felt left ear \"pop\" states that since then he has been dizzy and unable to move head side to side. Pt states he felt liquid come out of his ear. HPI    Deyanira Blair is a 26 y.o. male who presents with left ear pain after blowing his nose a few hours ago. He felt a pop and then felt sharp pain he had some dizziness as well. He states he felt like a liquid came out of his ear. No exacerbating relieving factors no other associated signs or symptoms. REVIEW OF SYSTEMS    See HPI for further details. Review of systems otherwise negative.    10 point review of systems reviewed and is otherwise negative  PAST MEDICAL HISTORY    Past Medical History:   Diagnosis Date    Gynecomastia        SURGICAL HISTORY    Past Surgical History:   Procedure Laterality Date    FINGER SURGERY Right 02/13/2017    closed reduction and percutaneous pinning right thumb fracture    FOOT SURGERY Right     KNEE SURGERY Left 03/2017    SHOULDER SURGERY Left     SKIN CANCER EXCISION      back; non-melanoma       CURRENT MEDICATIONS    Current Outpatient Rx   Medication Sig Dispense Refill    naproxen (NAPROSYN) 500 MG tablet Take 1 tablet by mouth 2 times daily for 20 doses 20 tablet 0       ALLERGIES    No Known Allergies    FAMILY HISTORY    Family History   Problem Relation Age of Onset    No Known Problems Mother     No Known Problems Brother     Cancer Maternal Grandfather         lung    Arthritis Paternal Grandfather        SOCIAL HISTORY    Social History     Socioeconomic History    Marital status: Single     Spouse name: None    Number of children: None    Years of education: None    Highest education level: None   Occupational History    Occupation:     Occupation: warehouse coord   Social Needs    Financial resource strain: None   [place]

## 2021-03-29 ENCOUNTER — OFFICE VISIT (OUTPATIENT)
Dept: ORTHOPEDIC SURGERY | Age: 31
End: 2021-03-29
Payer: COMMERCIAL

## 2021-03-29 ENCOUNTER — TELEPHONE (OUTPATIENT)
Dept: ORTHOPEDIC SURGERY | Age: 31
End: 2021-03-29

## 2021-03-29 VITALS — RESPIRATION RATE: 16 BRPM | WEIGHT: 165 LBS | BODY MASS INDEX: 24.44 KG/M2 | HEIGHT: 69 IN | TEMPERATURE: 98.2 F

## 2021-03-29 DIAGNOSIS — M79.644 PAIN OF RIGHT THUMB: Primary | ICD-10-CM

## 2021-03-29 PROCEDURE — L3908 WHO COCK-UP NONMOLDE PRE OTS: HCPCS | Performed by: ORTHOPAEDIC SURGERY

## 2021-03-29 PROCEDURE — 99214 OFFICE O/P EST MOD 30 MIN: CPT | Performed by: ORTHOPAEDIC SURGERY

## 2021-03-29 NOTE — PATIENT INSTRUCTIONS
Thank you for choosing [facility]) Physicians for your Hand and Upper Extremity needs. If we can be of any further assistance to you, please do not hesitate to contact us.     Office Phone Number:  [phone]  or  [phone]

## 2021-03-29 NOTE — LETTER
[facility]  [facility] [address] [address]  Phone: [phone]  Fax: [phone]    Earl Heredia MD        March 29, 2021     Patient: Raysa Ramos. YOB: 1990   Date of Visit: 3/29/2021       To Whom it May Concern:    Rylee Wharton was seen in my clinic on 3/29/2021. He may return to work with no use of right hand until follow-up appointment/cleared by physician. If you have any questions or concerns, please don't hesitate to call.     Sincerely,               Earl Heredia MD

## 2021-03-29 NOTE — PROGRESS NOTES
Mr. Lennie Kelly is a 31 y.o. right handed   who is seen today in Hand Surgical evaluation. He is seen today regarding an injury occurring on March 27th, 2021. He reports injuring his right thumb, having sustained injury during a boxing match. At the time of injury, there was not clear dislocation or malposition of the thumb. He was not seen for Emergency evaluation elsewhere, radiographs were not obtained & he has not been immobilized. By report, there  was not an associated skin injury. He reports moderate pain located in the thumb at the level of the Metacarpophalangeal joint, no tenderness of the remaining hand, wrist, or elbow. He notes today, no neurologic symptoms in the Whole Hand. Symptoms show no change over time. I have today reviewed with Lennie Saldivar. the clinically relevant, past medical history, medications, allergies,  family history, social history, and Review Of Systems & I have documented any details relevant to today's presenting complaints in my history above. Mr. Catie Lara Jr.'s self-reported past medical history, medications, allergies,  family history, social history, and Review Of Systems have been scanned into the chart under the \"Media\" tab. Physical Exam:  Mr. Catie Lara Jr.'s most recent vitals:  Vitals  Temp: 98.2 °F (36.8 °C)  Temp Source: Infrared  Resp: 16  Height: 5' 9\" (175.3 cm)  Weight: 165 lb (74.8 kg)    He is well nourished, oriented to person, place & time. He demonstrates appropriate mood and affect as well as normal gait and station. Skin: Normal in appearance in the injured hand, normal on the contralateral side  Digital range of motion is without significant limitation. Thumb range of motion is limited by pain. [employer] & EPL function are Intact. All other digits demonstrate unlimited range of motion on the contralateral side  Wrist range of motion is without significant limitation bilaterally.   Sensation is subjectively normal in the Whole Hand bilaterally  Vascular examination reveals normal and good capillary refill bilaterally. There is mild acute ecchymosis about the thumb MP joint on the Right, normal on the Left. Swelling is moderate in the thumb, centered about the metacarpophalangeal joint. There is mild ecchymosis surrounding the thumb base on the Right, normal on the Left. Maximal pain is elicited with palpation of the Ulnar aspect of the Thumb metacarpophalangeal joint. Examination for stability of the Thumb MP joint reveals marked laxity of the Ulnar Collateral Ligament without firm endpoint to excursion, no laxity of the Radial Collateral Ligament with firm endpoint to excursion. moderate dorsal/volar instability. There is not a palpable Stenner Lesion. There is no instability of the contralateral thumb. There is not clinical evidence of deformity or mal-rotation of the injured thumb. Muscular strength is clinically appropriate bilaterally. Radiographic Evaluation:  Radiographs are reviewed  today (2 views of the right Thumb). They do not demonstrate evidence of an acute fracture of the Thumb base of the proximal Phalanx. There  is angular malalignment. There is mild evidence of degenerative change. There is not evidence of other injury or bony fracture. Impression:  Mr. Joe Valladares. has sustained recent Ulnar Collateral Ligament Rupture of the Thumb  MP joint and presents requesting further treatment. Plan:  I have discussed with Mr. Joe Valladares. the various treatment options for treatment of his right Thumb Ulnar Collateral ligament injury. We discussed the options of Conservative management of the injury (with immobilization, rest, activity limitation, and the judicious use of OTC anti-inflamatory medications, and the other more aggressive or intensive treatment options, including surgical repair of the injured ligament.   He has elected to proceed conservativly, voicing an understanding of the other options available to him. I have explained the complications, limitations, expectations, alternatives, & risks of his chosen treatment. We discussed the possibility of residual symptoms as may be related to conservative treatment of ligament injuries including the possiblities of: persistent deformity, persistant pain, limitation of motion, future arthritic symptoms & the possible need for further treatment. I also explained the small possibility of further occult injury existing. He understood our discussion and was comfortable with his decision; he was provided with appropriate expectations. I have discussed with Mr. Elizabeth Vizcarramadiha that there is possibility of further or occult injury being present and unrecognized based upon my evaluation today. We have discussed the option of pursuing  more advanced imaging to more fully evaluate his presenting symptoms and the underlying cause, and a prescription for an MRI was provided. I have asked him to schedule a follow-up appointment for review of the MRI and further treatment decision making. He is specifically instructed to contact the office between now & his scheduled appointment if he has concerns related to the cast or the underlying injury. He is welcome to call for an appointment sooner if he has any additional concerns or questions.

## 2021-04-03 ENCOUNTER — HOSPITAL ENCOUNTER (OUTPATIENT)
Dept: MRI IMAGING | Age: 31
Discharge: HOME | End: 2021-04-03
Payer: COMMERCIAL

## 2021-04-03 DIAGNOSIS — M79.644 PAIN OF RIGHT THUMB: ICD-10-CM

## 2021-04-03 PROCEDURE — 73218 MRI UPPER EXTREMITY W/O DYE: CPT

## 2022-02-23 ENCOUNTER — OFFICE VISIT (OUTPATIENT)
Dept: ORTHOPEDIC SURGERY | Age: 32
End: 2022-02-23
Payer: COMMERCIAL

## 2022-02-23 VITALS — HEIGHT: 69 IN | WEIGHT: 180 LBS | BODY MASS INDEX: 26.66 KG/M2

## 2022-02-23 DIAGNOSIS — M54.50 LUMBAR PAIN: Primary | ICD-10-CM

## 2022-02-23 PROCEDURE — 99204 OFFICE O/P NEW MOD 45 MIN: CPT | Performed by: PHYSICIAN ASSISTANT

## 2022-02-23 PROCEDURE — L0642 LO SAG RI AN/POS PNL PRE OTS: HCPCS | Performed by: PHYSICIAN ASSISTANT

## 2022-02-23 NOTE — PROGRESS NOTES
New Patient: SPINE    2/23/2022     CHIEF COMPLAINT:    Chief Complaint   Patient presents with    New Patient     NEW PATIENT/[facility]/LOW BACK PAIN/RIGHT LUMBAR       HISTORY OF PRESENT ILLNESS:              The patient is a 27 y.o. male here for low back pain after a work injury 2/14/2022. He works in [employer] and on this date slipped on ice and fell injuring his low back. He currently describes constant aching right low back pain. Periodically he will have a \"sharp\" pain. His symptoms are constant but increased with sitting. He reports some temporary relief with resting on his left side and heat. Conservative care includes topical ointment, massage ball, urgent care evaluation. He denies any significant improvement at this current time. He denies any distal radiating pain. He denies any progressive numbness tingling or weakness. Denies any recent bowel or bladder dysfunction or saddle anesthesia. Denies any low back issues prior to this work injury. The pain assessment was noted & reviewed in the medical record today. Work Status/Functionality: HVAC    Past Medical History: Medical history form was reviewed today & scanned into the media tab  Past Medical History:   Diagnosis Date    Gynecomastia       Past Surgical History:     Past Surgical History:   Procedure Laterality Date    FINGER SURGERY Right 02/13/2017    closed reduction and percutaneous pinning right thumb fracture    FOOT SURGERY Right     KNEE SURGERY Left 03/2017    SHOULDER SURGERY Left     SKIN CANCER EXCISION      back; non-melanoma     Current Medications:     Current Outpatient Medications:     naproxen (NAPROSYN) 500 MG tablet, Take 1 tablet by mouth 2 times daily for 20 doses, Disp: 20 tablet, Rfl: 0  Allergies:  Patient has no known allergies. Social History:    reports that he has never smoked. He has never used smokeless tobacco. He reports that he does not drink alcohol and does not use drugs.   Family History: Family History   Problem Relation Age of Onset    No Known Problems Mother     No Known Problems Brother     Cancer Maternal Grandfather         lung    Arthritis Paternal Grandfather        REVIEW OF SYSTEMS: Full ROS reviewed & scanned into chart  CONSTITUTIONAL: Denies unexplained weight loss, fevers   SKIN: Denies active skin conditions   ENT: Denies dizziness, nosebleeds  RESPIRATORY: Denies current dyspnea, difficulty breathing  CARDIOVASCULAR: Denies chest pain   NEUROLOGICAL: Denies stroke, unsteady gait or progressive weakness  PSYCHOLOGICAL: Denies anxiety, depression   HEMATOLOGIC: Denies blood disorders, cancer  ENDOCRINE: Denies excessive thirst, urination, heat/cold  GI: Denies ulcer, nausea, vomiting, diarrhea   : Denies bowel or bladder incontinence       PHYSICAL EXAM:    Vitals: Height 5' 9\" (1.753 m), weight 180 lb (81.6 kg). Pain score 6/10    GENERAL EXAM:  · General Apparence: Patient is adequately groomed with no evidence of malnutrition. · Orientation: The patient is oriented to time, place and person. · Mood & Affect:The patient's mood and affect are appropriate   · Lymphatic: The lymphatic examination bilaterally reveals all areas to be without enlargement or induration  · Sensation: Sensation is intact without deficit  · Coordination/Balance: Good coordination     LUMBAR/SACRAL EXAMINATION:  · Inspection: Local inspection shows no step-off or bruising. Lumbar alignment is normal.  Sagittal and Coronal balance is neutral.      · Palpation:   Positive tenderness right of midline L5-S1 level  · Range of Motion: Moderate loss of flexion and extension more pain with flexion  · Strength:   Strength testing is 5/5 in all muscle groups tested. · Special Tests:   Straight leg raise and crossed SLR negative. Leg length and pelvis level.  0 out of 5 Cezar's signs. · Skin: There are no rashes, ulcerations or lesions.   · Reflexes: Reflexes are symmetrically 2+ at the patellar and ankle tendons. Clonus absent bilaterally at the feet. · Gait & station: Normal unassisted  · Additional Examinations:   · RIGHT LOWER EXTREMITY: Inspection/examination of the right lower extremity does not show any tenderness, deformity or injury. Range of motion is full. There is no gross instability. There are no rashes, ulcerations or lesions. Strength and tone are normal.  ·   · LEFT LOWER EXTREMITY:  Inspection/examination of the left lower extremity does not show any tenderness, deformity or injury. Range of motion is full. There is no gross instability. There are no rashes, ulcerations or lesions. Strength and tone are normal.    Diagnostic Testing:    Lumbar x-rays reviewed independently from urgent care February 2022 shows questionable right L5 transverse process fracture. L2 mild superior and inferior endplate irregularity possible Schmorl's node vs mild compression fx      Impression:  1) Acute right low back pain--r/out acute fracture  2) H/o work injury 2-14-22      Plan:   1) We had a long discussion. We reviewed his recent lumbar x-rays from the urgent care. I recommend a lumbar MRI without to rule out acute fracture given his mechanism of injury and point tenderness    2) Quick draw brace    3) Off work until Monday 2/28/22 then return light duty restrictions    4) IBU with food PRN    5) F/u to review L MRI            Procedures    Breg Quick Draw Lumbar Brace     Patient was prescribed a Breg Quick Draw Lumbar Brace. The lumbar spine will require stabilization / immobilization from this semi-rigid / rigid orthosis to improve their function. The orthosis will assist in protecting the affected area, provide functional support and facilitate healing.  This orthosis is required for the following reasons:    Reduce pain by restricting mobility of the trunk  Facilitate healing following an injury to the spine or related soft tissues  Support weak spinal muscles    The patient was educated and fit by a healthcare professional with expert knowledge and specialization in brace application while under the direct supervision of the physician. Verbal and written instructions for the use of and application of this item were provided. They were instructed to contact the office immediately should the brace result in increased pain, decreased sensation, increased swelling or worsening of the condition.            Ctra. Emanuel Mcmillan, PAFabioC, MPAS  Board Certified by the [facility] W Florentino Melendez

## 2022-02-23 NOTE — LETTER
[facility] [address]  [facility] [address]  Phone: [phone]  Fax: [phone]    Noel Mcdermott        February 23, 2022     Patient: Katarzyna Mcgill. YOB: 1990   Date of Visit: 2/23/2022       To Whom It May Concern: It is my medical opinion that Jayjay Arechiga may return to work on 2/28/2022 with the following restrictions: on 2/28/2022 he will return to work on light duty with the following restrictions: No heavy lifting greater than 15lbs, and no repetative bending or twisting. .    If you have any questions or concerns, please don't hesitate to call.     Sincerely,        Ines Everett PA-C

## 2022-03-01 ENCOUNTER — TELEPHONE (OUTPATIENT)
Dept: ORTHOPEDIC SURGERY | Age: 32
End: 2022-03-01

## 2022-03-01 NOTE — TELEPHONE ENCOUNTER
Called & spoke with the patient informing him that as far as I could see in his chart that his MRI was still under review. Patient informed me that he called his . The  informed the patient that he received the MRI request and will work to get it approved, patient was told from  that it would be around 2-3 days before the patient would hear back from the  about his MRI approval. Patient will then schedule MRI after it is approved and will follow back up after the MRI.

## 2022-03-08 ENCOUNTER — TELEPHONE (OUTPATIENT)
Dept: ORTHOPEDIC SURGERY | Age: 32
End: 2022-03-08

## 2022-03-08 NOTE — TELEPHONE ENCOUNTER
General Question     Subject: Scheduling MRI   Patient and /or Facility Request: Patient  Contact Number: [phone]    MRI is approved would like a return call to get it scheduled.

## 2022-03-10 ENCOUNTER — HOSPITAL ENCOUNTER (OUTPATIENT)
Dept: MRI IMAGING | Age: 32
Discharge: HOME | End: 2022-03-10

## 2022-03-10 DIAGNOSIS — M54.50 LUMBAR PAIN: ICD-10-CM

## 2022-03-10 PROCEDURE — 72148 MRI LUMBAR SPINE W/O DYE: CPT

## 2022-03-15 ENCOUNTER — OFFICE VISIT (OUTPATIENT)
Dept: ORTHOPEDIC SURGERY | Age: 32
End: 2022-03-15
Payer: COMMERCIAL

## 2022-03-15 VITALS — BODY MASS INDEX: 26.66 KG/M2 | WEIGHT: 180 LBS | HEIGHT: 69 IN

## 2022-03-15 DIAGNOSIS — S39.012D STRAIN OF LUMBAR REGION, SUBSEQUENT ENCOUNTER: Primary | ICD-10-CM

## 2022-03-15 PROCEDURE — 99214 OFFICE O/P EST MOD 30 MIN: CPT | Performed by: PHYSICIAN ASSISTANT

## 2022-03-15 RX ORDER — METHYLPREDNISOLONE 4 MG/1
TABLET ORAL
Qty: 1 KIT | Refills: 0 | Status: SHIPPED | OUTPATIENT
Start: 2022-03-15 | End: 2022-04-26 | Stop reason: ALTCHOICE

## 2022-03-15 NOTE — LETTER
[facility] [address]  [facility] [address]  Phone: [phone]  Fax: [phone]    Poppy Vincent        March 15, 2022     Patient: Reji Downey. YOB: 1990   Date of Visit: 3/15/2022       To Whom It May Concern: It is my medical opinion that Elena Gillespie may return to light duty immediately with the following restrictions: No heavy lifting greater than 15lbs, and no repetative bending or twisting. This is to last for 4 weeks. If you have any questions or concerns, please don't hesitate to call.     Sincerely,        Elizabeth Shane PA-C

## 2022-03-15 NOTE — PROGRESS NOTES
FOLLOW UP SPINE    3/15/2022     CHIEF COMPLAINT:    Chief Complaint   Patient presents with    Follow-up     F/U TR MRI LUMBAR       HISTORY OF PRESENT ILLNESS:              The patient is a 27 y.o. male here to review lumbar MRI for low back pain after a work injury 2/14/2022. He works in [employer] and on this date slipped on ice and fell injuring his low back. He currently describes constant aching right low back pain. Periodically he will have a \"sharp\" pain. His symptoms are constant but increased with sitting. More recently he has noticed pain with coughing or sneezing in his right low back. He reports some temporary relief with resting on his left side and heat. Conservative care includes brace, topical ointment, massage ball, urgent care evaluation. He does not like to take medication has not started ibuprofen. He denies any significant improvement at this current time and feels pain is worsening. He denies any distal radiating pain. He denies any progressive numbness tingling or weakness. Denies any recent bowel or bladder dysfunction or saddle anesthesia. Denies any low back issues prior to this work injury. The pain assessment was noted & reviewed in the medical record today.      Work Status/Functionality: HVAC    Past Medical History: Medical history form was reviewed today & scanned into the media tab  Past Medical History:   Diagnosis Date    Gynecomastia       Past Surgical History:     Past Surgical History:   Procedure Laterality Date    FINGER SURGERY Right 02/13/2017    closed reduction and percutaneous pinning right thumb fracture    FOOT SURGERY Right     KNEE SURGERY Left 03/2017    SHOULDER SURGERY Left     SKIN CANCER EXCISION      back; non-melanoma     Current Medications:     Current Outpatient Medications:     naproxen (NAPROSYN) 500 MG tablet, Take 1 tablet by mouth 2 times daily for 20 doses, Disp: 20 tablet, Rfl: 0  Allergies:  Patient has no known allergies. Social History:    reports that he has never smoked. He has never used smokeless tobacco. He reports that he does not drink alcohol and does not use drugs. Family History:   Family History   Problem Relation Age of Onset    No Known Problems Mother     No Known Problems Brother     Cancer Maternal Grandfather         lung    Arthritis Paternal Grandfather        REVIEW OF SYSTEMS: Full ROS reviewed & scanned into chart  CONSTITUTIONAL: Denies unexplained weight loss, fevers   SKIN: Denies active skin conditions       PHYSICAL EXAM:    Vitals: Height 5' 9\" (1.753 m), weight 180 lb (81.6 kg). Pain score 8/10    GENERAL EXAM:  · General Apparence: Patient is adequately groomed with no evidence of malnutrition. · Orientation: The patient is oriented to time, place and person. · Mood & Affect:The patient's mood and affect are appropriate   · Lymphatic: The lymphatic examination bilaterally reveals all areas to be without enlargement or induration  · Sensation: Sensation is intact without deficit  · Coordination/Balance: Good coordination     LUMBAR/SACRAL EXAMINATION:  · Inspection: Local inspection shows no step-off or bruising. Lumbar alignment is normal.  Sagittal and Coronal balance is neutral.      · Palpation:  + right paraspinal tenderness L5-S1 level  · Range of Motion: mild-moderate loss of flexion and extension more pain with flexion  · Strength:   Strength testing is 5/5 in all muscle groups tested. · Special Tests:   Straight leg raise and crossed SLR negative. Leg length and pelvis level.  0 out of 5 Cezar's signs. · Skin: There are no rashes, ulcerations or lesions. · Reflexes: Reflexes are symmetrically 2+ at the patellar and ankle tendons. Clonus absent bilaterally at the feet. · Gait & station: Normal unassisted  · Additional Examinations:   · RIGHT LOWER EXTREMITY: Inspection/examination of the right lower extremity does not show any tenderness, deformity or injury. Range of motion is full. There is no gross instability. There are no rashes, ulcerations or lesions. Strength and tone are normal.  ·   · LEFT LOWER EXTREMITY:  Inspection/examination of the left lower extremity does not show any tenderness, deformity or injury. Range of motion is full. There is no gross instability. There are no rashes, ulcerations or lesions. Strength and tone are normal.    Diagnostic Testing:    Lumbar MRI scan report independently reviewed from March 2022 showing minimal multilevel DDD, disc bulging L5-S1. No high-grade central stenosis. No acute fracture. Small area of modic degenerative changes L5--discussed with radiologist, Dr. Jessi Stacy. Lumbar x-rays reviewed independently from urgent care February 2022 shows questionable right L5 transverse process fracture. L2 mild superior and inferior endplate irregularity possible Schmorl's node vs mild compression fx        Impression:  1) Acute right low back pain, lumbar strain   2) H/o work injury 2-14-22      Plan:   1) We reviewed his lumbar MRI scan today in detail  2) Start PT  3) MDP--side effects discussed.   4) IBU after MDP  5) Cont light duty x1mo, [employer] completed  6) F/u 1mo        Nikki Apple PA-C, MPAS  Board Certified by the [address]

## 2022-03-25 ENCOUNTER — HOSPITAL ENCOUNTER (OUTPATIENT)
Dept: PHYSICAL THERAPY | Age: 32
Setting detail: THERAPIES SERIES
Discharge: HOME | End: 2022-03-25
Payer: COMMERCIAL

## 2022-03-25 PROCEDURE — 97110 THERAPEUTIC EXERCISES: CPT

## 2022-03-25 PROCEDURE — 97161 PT EVAL LOW COMPLEX 20 MIN: CPT

## 2022-03-25 PROCEDURE — 97140 MANUAL THERAPY 1/> REGIONS: CPT

## 2022-03-25 NOTE — FLOWSHEET NOTE
The [address] [facility] and [address] [facility], [facility] [address] [facility], [address] [place] [address]  Phone: [phone]   Fax:     [phone]    Physical Therapy Daily Treatment Note  Date:  3/25/2022    Patient Name:  Nicole Anguiano.     :  1990  MRN: [id number]  Restrictions/Precautions:    Medical/Treatment Diagnosis Information:  Diagnosis: S39.012D (ICD-10-CM) - Strain of lumbar region, subsequent encounter  Treatment Diagnosis: S39.012D (ICD-10-CM) - Strain of lumbar region, subsequent encounter  Insurance/Certification information:  PT Insurance Information: [address] [facility]  Physician Information:  Referring Practitioner: Harjinder García PA-C  Has the plan of care been signed (Y/N):        []  Yes  [x]  No     Date of Patient follow up with Physician:       Is this a Progress Report:     []  Yes  [x]  No        If Yes:  Date Range for reporting period:  Beginning 3/25/22  Ending    Progress report will be due (10 Rx or 30 days whichever is less):        Recertification will be due (POC Duration  / 90 days whichever is less):          Visit # Insurance Allowable Auth Required   1   3/25 1-2xweek for 4-6 weeks [x]  Yes []  No        Functional Scale: [place]; 48% deficit   Date assessed:  3/25/22     Latex Allergy:  [x]NO      []YES  Preferred Language for Healthcare:   [x]English       []other:      Pain level:  4-8/10   3/25     SUBJECTIVE:  See eval   3/25     OBJECTIVE:   ROM  3/25   Comments   Trunk flexion To mid anterior tibia + tightness present    Trunk extension 50% impaired    Trunk R sidebend To mid lateral thigh + tightness present    Trunk L sidebend WNL    Trunk R rotation WNL    Trunk L rotation WNL    HS flexibility                        Strength  3/25 Left Right Comments   Hip flexion(L2) 5 4+    Knee extension(L3) 5 5    Knee flexion(S1-2) 5 5    Ankle dorsiflexion(L4) 5 5    Toe extension(L5) 5 5    Ankle eversion/plantar flexion(S1) 5 5    Hip abd Special tests 3/25   Comments   SLR      Slump test      Pelvic symmetry  equal     Segmental Spinal mobility      Heel walk negative     Toe walk negative     Tandem walk                 DTRs Left Right Comments   Patellar(L3-L4) = =     Achilles(S1-S2) = =                  Joint mobility: 3/25              [x]Normal               []Hypo              []Hyper     Palpation: TTP along lower R lumbar paraspinals   3/25     Functional Mobility/Transfers: Independent with increased pain with ADL's; difficulty donning and doffing socks and shoes; unable to perform full duty at work; unable to lift and carry heavy objects; unable to work out or participate in boxing; unable to ambulate for an extended period fo time; difficulty sleeping   3/25     Posture: WNL  3/25     Gait: (include devices/WB status) Antalgic gait present  3/25                          [x] Patient history, allergies, meds reviewed. Medical chart reviewed. See intake form. 3/25     Review Of Systems (ROS):   [x]Performed Review of systems (Integumentary, CardioPulmonary, Neurological) by intake and observation. Intake form has been scanned into medical record. Patient has been instructed to contact their primary care physician regarding ROS issues if not already being addressed at this time.   3/25     RESTRICTIONS/PRECAUTIONS:     Exercises/Interventions: BILATERAL  ROM/stretches     SKTC 10 sec x 10    DKTC 10 sec x 10    Prayer stretch     Supine HS 10 sec x 10    Pelvic tilt     Hook lying rotation 20x each    Cat and camel          Strengthening     SLR     Quadruped alternate UE reaches     Quadruped alternate LE reaches     Quadruped alternate UE/LE reaches     [email or web address] heel raises     Sit ups      planks     Tband lat pulls     Tband rows         Manual Intervention             Prone PA      GISTM/STM      Lumbar Manip      SI Manip      Hip belt mobs      Hip LA distraction              Therapeutic Exercise and NMR EXR  [x] (75436) Provided verbal/tactile cueing for activities related to strengthening, flexibility, endurance, ROM  for improvements in proximal hip and core control with self care, mobility, lifting and ambulation.  [] (89700) Provided verbal/tactile cueing for activities related to improving balance, coordination, kinesthetic sense, posture, motor skill, proprioception  to assist with core control in self care, mobility, lifting, and ambulation. Therapeutic Activities:    [] (81276 or 38043) Provided verbal/tactile cueing for activities related to improving balance, coordination, kinesthetic sense, posture, motor skill, proprioception and motor activation to allow for proper function  with self care and ADLs  [] (14800) Provided training and instruction to the patient for proper core and proximal hip recruitment and positioning with ambulation re-education     Home Exercise Program:    [x] (02462) Reviewed/Progressed HEP activities related to strengthening, flexibility, endurance, ROM of core, proximal hip and LE for functional self-care, mobility, lifting and ambulation   [] (70282) Reviewed/Progressed HEP activities related to improving balance, coordination, kinesthetic sense, posture, motor skill, proprioception of core, proximal hip and LE for self care, mobility, lifting, and ambulation      Manual Treatments:  PROM / STM / Oscillations-Mobs:  G-I, II, III, IV (PA's, Inf., Post.)  [x] (12206) Provided manual therapy to mobilize proximal hip and LS spine soft tissue/joints for the purpose of modulating pain, promoting relaxation,  increasing ROM, reducing/eliminating soft tissue swelling/inflammation/restriction, improving soft tissue extensibility and allowing for proper ROM for normal function with self care, mobility, lifting and ambulation.      Modalities:       Charges:  Timed Code Treatment Minutes: 35 + EVAL   Total Treatment Minutes: 60  4:10-5:10    4:05-4:30  25 min EVAL  4:30- 4:45 15 min MAN  4:50-5:10  20 min TE    [x] EVAL (LOW) 69909 (typically 20 minutes face-to-face)  [] EVAL (MOD) 04872 (typically 30 minutes face-to-face)  [] EVAL (HIGH) 05583 (typically 45 minutes face-to-face)  [] RE-EVAL     [x] EC(67421) x 1    [] IONTO  [] NMR (83269) x     [] VASO  [x] Manual (50787) x 1     [] Other:  [] TA x      [] Mech Traction (17978)  [] ES(attended) (83857)      [] ES (un) (16773):     Goals:   Patient stated goal: Return to work; Return to working out and boxing  [] Progressing: [] Met: [] Not Met: [] Adjusted    Therapist goals for Patient:   Short Term Goals: To be achieved in: 2 weeks  1. Independent in HEP and progression per patient tolerance, in order to prevent re-injury. [] Progressing: [] Met: [] Not Met: [] Adjusted   2. Patient will have a decrease in pain to facilitate improvement in movement, function, and ADLs as indicated by Functional Deficits. [] Progressing: [] Met: [] Not Met: [] Adjusted    Long Term Goals: To be achieved in: 4-6 weeks  1. Disability index score of 25% or less for the [place] to assist with reaching prior level of function. [] Progressing: [] Met: [] Not Met: [] Adjusted  2. Patient will demonstrate increased AROM to WNL, good LS mobility, good hip ROM to allow for proper joint functioning as indicated by patients Functional Deficits. [] Progressing: [] Met: [] Not Met: [] Adjusted  3. Patient will demonstrate an increase in Strength to good proximal hip and core activation to allow for proper functional mobility as indicated by patients Functional Deficits. [] Progressing: [] Met: [] Not Met: [] Adjusted  4. Patient will return to Independent functional activities without increased symptoms or restriction. [] Progressing: [] Met: [] Not Met: [] Adjusted  5. Patient will report returning to gym with no restrictions.     [] Progressing: [] Met: [] Not Met: [] Adjusted     Overall Progression Towards Functional goals/ Treatment Progress Update:  [] Patient is progressing as expected towards functional goals listed. [] Progression is slowed due to complexities/Impairments listed. [] Progression has been slowed due to co-morbidities. [x] Plan just implemented, too soon to assess goals progression <30days   [] Goals require adjustment due to lack of progress  [] Patient is not progressing as expected and requires additional follow up with physician  [] Other    Prognosis for POC: [x] Good [] Fair  [] Poor      Patient requires continued skilled intervention: [x] Yes  [] No    Treatment/Activity Tolerance:  [x] Patient able to complete treatment  [] Patient limited by fatigue  [] Patient limited by pain     [] Patient limited by other medical complications  [] Other:     Patient education:      Prognosis: [x] Good [] Fair  [] Poor    Patient Requires Follow-up: [x] Yes  [] No    PLAN: See eval  [] Continue per plan of care [] Alter current plan (see comments)  [x] Plan of care initiated [] Hold pending MD visit [] Discharge    Electronically signed by: Ravi Montague, PT, DPT    *If patient does not return for further follow ups after this date. Please consider this as the patients discharge from physical therapy.

## 2022-03-25 NOTE — PLAN OF CARE
The [address] [place] [address] and [address]  Phone [phone]  Fax [phone]      Physical Therapy Certification    Dear Referring Practitioner: Lacey Culp PA-C,    We had the pleasure of evaluating the following patient for physical therapy services at [address] [place] [address]. A summary of our findings can be found in the initial assessment below. This includes our plan of care. If you have any questions or concerns regarding these findings, please do not hesitate to contact me at the office phone number checked above. Thank you for the referral.       Physician Signature:_______________________________Date:__________________  By signing above (or electronic signature), therapists plan is approved by physician      Patient: Marilynn Landaverde. : 1990   MRN: [id number]  Referring Physician: Referring Practitioner: Lacey Culp PA-C      Evaluation Date: 3/25/2022      Medical Diagnosis Information:  Diagnosis: S39.012D (ICD-10-CM) - Strain of lumbar region, subsequent encounter   Treatment Diagnosis: S39.012D (ICD-10-CM) - Strain of lumbar region, subsequent encounter                                         Insurance information: PT Insurance Information: Aram Webber     Precautions/ Contra-indications:       Latex Allergy:  [x]NO      []YES  Preferred Language for Healthcare:   [x]English       []other:    SUBJECTIVE: Patient stated complaint: Patient is a 27year old male who presents to the clinic with reports of low back pain. Patient states that on  he was at work and went out to his truck to get some tools and slipped on ice. He states that he fell straight on his back and his right side. He states that initially he didn't have a lot of pain but within two days his back had seized up and he was having trouble moving around.  He states that he went to urgent care the following week and had radiographs. He states that he then had to wait another week to see JOSE DE JESUS. He states that he then had a MRI. MRI revealed:  Disc and osteophytes narrow the neural foramina bilaterally at L4-L5, and   L5-S1 as discussed above.  No stenosis of the thecal sac in the lumbar region. He states that he was prescribed a steroid dose pack that did help. He states that he currently isnt taking anything for his back. He states that he is very active and wants to get back to work and also working out and boxing.       Relevant Medical History:     C-SSRS Triggered by Intake questionnaire (Past 2 wk assessment):   [x] No, Questionnaire did not trigger screening.   [] Yes, Patient intake triggered further evaluation      [] C-SSRS Screening completed  [] PCP notified via Plan of Care  [] Emergency services notified     Functional Disability Index/G-Codes: [place]: 48% deficit    Pain Scale: 4-8/10  Easing factors: Resting  Provocative factors: sleeping; lifting; reaching; walking; sitting for a period of time; activity     Type: [x]Constant           []Intermittent      []Radiating         []Localized         []other:                Numbness/Tingling: Denies     Occupation/School: HVAC system for [employer] Status/Prior Level of Function: Independent with ADLs and IADLs, working full time; boxing; working out     OBJECTIVE:   ROM  3/25   Comments   Trunk flexion To mid anterior tibia + tightness present    Trunk extension 50% impaired    Trunk R sidebend To mid lateral thigh + tightness present    Trunk L sidebend WNL    Trunk R rotation WNL    Trunk L rotation WNL    HS flexibility                        Strength  3/25 Left Right Comments   Hip flexion(L2) 5 4+    Knee extension(L3) 5 5    Knee flexion(S1-2) 5 5    Ankle dorsiflexion(L4) 5 5    Toe extension(L5) 5 5    Ankle eversion/plantar flexion(S1) 5 5    Hip abd             Special tests 3/25   Comments   SLR      Slump test      Pelvic symmetry  equal Segmental Spinal mobility      Heel walk negative     Toe walk negative     Tandem walk                 DTRs Left Right Comments   Patellar(L3-L4) = =     Achilles(S1-S2) = =                  Joint mobility: 3/25              [x]Normal               []Hypo              []Hyper     Palpation: TTP along lower R lumbar paraspinals   3/25     Functional Mobility/Transfers: Independent with increased pain with ADL's; difficulty donning and doffing socks and shoes; unable to perform full duty at work; unable to lift and carry heavy objects; unable to work out or participate in boxing; unable to ambulate for an extended period fo time; difficulty sleeping   3/25     Posture: WNL  3/25     Gait: (include devices/WB status) Antalgic gait present  3/25                          [x] Patient history, allergies, meds reviewed. Medical chart reviewed. See intake form. 3/25     Review Of Systems (ROS):  [x]Performed Review of systems (Integumentary, CardioPulmonary, Neurological) by intake and observation. Intake form has been scanned into medical record. Patient has been instructed to contact their primary care physician regarding ROS issues if not already being addressed at this time.   3/25     Co-morbidities/Complexities (which will affect course of rehabilitation):   [x]None              Arthritic conditions   []Rheumatoid arthritis (M05.9)  []Osteoarthritis (M19.91)    Cardiovascular conditions   []Hypertension (I10)  []Hyperlipidemia (E78.5)  []Angina pectoris (I20)  []Atherosclerosis (I70)    Musculoskeletal conditions   []Disc pathology   []Congenital spine pathologies   []Prior surgical intervention  []Osteoporosis (M81.8)  []Osteopenia (M85.8)   Endocrine conditions   []Hypothyroid (E03.9)  []Hyperthyroid Gastrointestinal conditions   []Constipation (V65.37)    Metabolic conditions   []Morbid obesity (E66.01)  []Diabetes type 1(E10.65) or 2 (E11.65)   []Neuropathy (G60.9)      Pulmonary conditions   []Asthma (J45)  []Coughing   []COPD (J44.9)    Psychological Disorders  []Anxiety (F41.9)  []Depression (F32.9)   []Other:    []Other:            Barriers to/and or personal factors that will affect rehab potential:              [x]Age  [x]Sex              [x]Motivation/Lack of Motivation                        []Co-Morbidities              []Cognitive Function, education/learning barriers              []Environmental, home barriers              []profession/work barriers  [x]past PT/medical experience  []other:  Justification:      Falls Risk Assessment (30 days):   [x] Falls Risk assessed and no intervention required.   [] Falls Risk assessed and Patient requires intervention due to being higher risk   TUG score (>12s at risk):     [] Falls education provided, including        ASSESSMENT:   Functional Impairments:                []Noted lumbar/proximal hip hypomobility              []Noted lumbosacral and/or generalized hypermobility              [x]Decreased Lumbosacral/hip/LE functional ROM              [x]Decreased core/proximal hip strength and neuromuscular control               [x]Decreased LE functional strength               []Abnormal reflexes/sensation/myotomal/dermatomal deficits  []Reduced balance/proprioceptive control               []other:       Functional Activity Limitations (from functional questionnaire and intake)              [x]Reduced ability to tolerate prolonged functional positions              [x]Reduced ability or difficulty with changes of positions or transfers between positions              [x]Reduced ability to maintain good posture and demonstrate good body mechanics with sitting, bending, and lifting              [x]Reduced ability to sleep              [x] Reduced ability or tolerance with driving and/or computer work              [x]Reduced ability to perform lifting, reaching, carrying tasks              [x]Reduced ability to squat              [x]Reduced ability to forward bend [x]Reduced ability to ambulate prolonged functional periods/distances/surfaces              [x]Reduced ability to ascend/descend stairs              []other:       Participation Restrictions              [x]Reduced participation in self care activities              [x]Reduced participation in home management activities              [x]Reduced participation in work activities              [x]Reduced participation in social activities. [x]Reduced participation in sport/recreational activities. Classification:              [x]Signs/symptoms consistent with Lumbar instability/stabilization subgroup. []Signs/symptoms consistent with Lumbar mobilization/manipulation subgroup, myotomes and dermatomes intact. Meets manipulation criteria. [x]Signs/symptoms consistent with Lumbar direction specific/centralization subgroup              [x]Signs/symptoms consistent with Lumbar traction subgroup                            []Signs/symptoms consistent with lumbar facet dysfunction              []Signs/symptoms consistent with lumbar stenosis type dysfunction              []Signs/symptoms consistent with nerve root involvement including myotome & dermatome dysfunction              []Signs/symptoms consistent with post-surgical status including: decreased ROM, strength and function.               []signs/symptoms consistent with pathology which may benefit from Dry needling                []other:       Prognosis/Rehab Potential:                                       []Excellent              [x]Good                 []Fair              []Poor     Tolerance of evaluation/treatment:               []Excellent              [x]Good                 []Fair              []Poor     Physical Therapy Evaluation Complexity Justification  [x] A history of present problem with:  [x] no personal factors and/or comorbidities that impact the plan of care;  []1-2 personal factors and/or comorbidities that impact the plan of care  []3 personal factors and/or comorbidities that impact the plan of care  [x] An examination of body systems using standardized tests and measures addressing any of the following: body structures and functions (impairments), activity limitations, and/or participation restrictions;:  [] a total of 1-2 or more elements   [x] a total of 3 or more elements   [] a total of 4 or more elements   [x] A clinical presentation with:  [x] stable and/or uncomplicated characteristics   [] evolving clinical presentation with changing characteristics  [] unstable and unpredictable characteristics;   [x] Clinical decision making of [x] low, [] moderate, [] high complexity using standardized patient assessment instrument and/or measurable assessment of functional outcome. [x] EVAL (LOW) 43076 (typically 20 minutes face-to-face)  [] EVAL (MOD) 28237 (typically 30 minutes face-to-face)  [] EVAL (HIGH) 69593 (typically 45 minutes face-to-face)  [] RE-EVAL            PLAN:      Frequency/Duration:  1-2 days per week for 4-6 Weeks:  Interventions:  [x]  Therapeutic exercise including: strength training, ROM, for LE, Glutes and core   [x]  NMR activation and proprioception for glutes , LE and Core   [x]  Manual therapy as indicated for Hip complex, LE and spine to include: Dry Needling/IASTM, STM, PROM, Gr I-IV mobilizations, manipulation. [x]  Modalities as needed that may include: thermal agents, E-stim, Biofeedback, US, iontophoresis as indicated  [x]  Patient education on joint protection, postural re-education, activity modification, progression of HEP. HEP instruction:   Access Code: [email or web address]  URL: [email or web address]  Date: 03/25/2022  Prepared by: Will Hopson    Exercises  Supine Single Knee to Chest Stretch - 1 x daily - 7 x weekly - 1 sets - 10 reps - 10 hold  Supine Double Knee to Chest - 1 x daily - 7 x weekly - 1 sets - 10 reps - 10 hold  Supine Lower Trunk Rotation - 1 x daily - 7 x weekly - 2 sets - 10 reps  Supine Hamstring Stretch - 1 x daily - 7 x weekly - 1 sets - 10 reps - 10 hold     GOALS:   Patient stated goal: Return to work; Return to working out and boxing  [] Progressing: [] Met: [] Not Met: [] Adjusted    Therapist goals for Patient:   Short Term Goals: To be achieved in: 2 weeks  1. Independent in HEP and progression per patient tolerance, in order to prevent re-injury. [] Progressing: [] Met: [] Not Met: [] Adjusted   2. Patient will have a decrease in pain to facilitate improvement in movement, function, and ADLs as indicated by Functional Deficits. [] Progressing: [] Met: [] Not Met: [] Adjusted    Long Term Goals: To be achieved in: 4-6 weeks  1. Disability index score of 25% or less for the [place] to assist with reaching prior level of function. [] Progressing: [] Met: [] Not Met: [] Adjusted  2. Patient will demonstrate increased AROM to WNL, good LS mobility, good hip ROM to allow for proper joint functioning as indicated by patients Functional Deficits. [] Progressing: [] Met: [] Not Met: [] Adjusted  3. Patient will demonstrate an increase in Strength to good proximal hip and core activation to allow for proper functional mobility as indicated by patients Functional Deficits. [] Progressing: [] Met: [] Not Met: [] Adjusted  4. Patient will return to Independent functional activities without increased symptoms or restriction. [] Progressing: [] Met: [] Not Met: [] Adjusted  5. Patient will report returning to gym with no restrictions.     [] Progressing: [] Met: [] Not Met: [] Adjusted       Electronically signed by: Meme Herrera, PT, DPT

## 2022-03-29 ENCOUNTER — HOSPITAL ENCOUNTER (OUTPATIENT)
Dept: PHYSICAL THERAPY | Age: 32
Setting detail: THERAPIES SERIES
Discharge: HOME | End: 2022-03-29
Payer: COMMERCIAL

## 2022-03-29 PROCEDURE — 97140 MANUAL THERAPY 1/> REGIONS: CPT

## 2022-03-29 PROCEDURE — 97110 THERAPEUTIC EXERCISES: CPT

## 2022-03-29 NOTE — FLOWSHEET NOTE
5 5    Ankle dorsiflexion(L4) 5 5    Toe extension(L5) 5 5    Ankle eversion/plantar flexion(S1) 5 5    Hip abd             Special tests 3/25   Comments   SLR      Slump test      Pelvic symmetry  equal     Segmental Spinal mobility      Heel walk negative     Toe walk negative     Tandem walk                 DTRs Left Right Comments   Patellar(L3-L4) = =     Achilles(S1-S2) = =                  Joint mobility: 3/25              [x]Normal               []Hypo              []Hyper     Palpation: TTP along lower R lumbar paraspinals   3/25     Functional Mobility/Transfers: Independent with increased pain with ADL's; difficulty donning and doffing socks and shoes; unable to perform full duty at work; unable to lift and carry heavy objects; unable to work out or participate in boxing; unable to ambulate for an extended period fo time; difficulty sleeping   3/25     Posture: WNL  3/25     Gait: (include devices/WB status) Antalgic gait present  3/25                          [x] Patient history, allergies, meds reviewed. Medical chart reviewed. See intake form. 3/25     Review Of Systems (ROS):   [x]Performed Review of systems (Integumentary, CardioPulmonary, Neurological) by intake and observation. Intake form has been scanned into medical record. Patient has been instructed to contact their primary care physician regarding ROS issues if not already being addressed at this time.   3/25     RESTRICTIONS/PRECAUTIONS:     Exercises/Interventions: BILATERAL  ROM/stretches     SKTC 10 x 2 (after manual stretch of Hams) 3/29   DKTC 10 sec x 10    Prayer stretch to Cobra 20x1 3/29   Supine HS 10 sec x 10    Pelvic tilt     Toe Touches  3 X 10  3/29   Hamstring (w/5 sec CR) 1 min X 3 3/29   Hook lying rotation 20x each 3/29   Cat and camel          Strengthening     SLR     Quadruped alternate UE reaches     Upright Bike 5 mins 3/29   Goblet Squat 8 lbs 10 X 1 3/29   Quadruped alternate LE reaches     Quadruped alternate UE/LE reaches     Ball squats     Ball heel raises     Sit ups      planks     Tband lat pulls     Tband rows         Manual Intervention             Manual Hams. Stretch (5 sec. CR) 1 min X 2   3/29   Lumbar rotation  In SL 1 min X 5  3/29   Prone PA      GISTM/STM 5 min     Lumbar Manip      SI Manip      Hip belt mobs      Hip LA distraction              Therapeutic Exercise and NMR EXR  [x] (98367) Provided verbal/tactile cueing for activities related to strengthening, flexibility, endurance, ROM  for improvements in proximal hip and core control with self care, mobility, lifting and ambulation.  [] (78561) Provided verbal/tactile cueing for activities related to improving balance, coordination, kinesthetic sense, posture, motor skill, proprioception  to assist with core control in self care, mobility, lifting, and ambulation.      Therapeutic Activities:    [] (79611 or 69294) Provided verbal/tactile cueing for activities related to improving balance, coordination, kinesthetic sense, posture, motor skill, proprioception and motor activation to allow for proper function  with self care and ADLs  [] (58474) Provided training and instruction to the patient for proper core and proximal hip recruitment and positioning with ambulation re-education     Home Exercise Program:    [x] (45488) Reviewed/Progressed HEP activities related to strengthening, flexibility, endurance, ROM of core, proximal hip and LE for functional self-care, mobility, lifting and ambulation   [] (04754) Reviewed/Progressed HEP activities related to improving balance, coordination, kinesthetic sense, posture, motor skill, proprioception of core, proximal hip and LE for self care, mobility, lifting, and ambulation      Manual Treatments:  PROM / STM / Oscillations-Mobs:  G-I, II, III, IV (PA's, Inf., Post.)  [x] (08708) Provided manual therapy to mobilize proximal hip and LS spine soft tissue/joints for the purpose of modulating pain, promoting [] Adjusted  4. Patient will return to Independent functional activities without increased symptoms or restriction. [] Progressing: [] Met: [] Not Met: [] Adjusted  5. Patient will report returning to gym with no restrictions. [] Progressing: [] Met: [] Not Met: [] Adjusted     Overall Progression Towards Functional goals/ Treatment Progress Update:  [] Patient is progressing as expected towards functional goals listed. [] Progression is slowed due to complexities/Impairments listed. [] Progression has been slowed due to co-morbidities. [x] Plan just implemented, too soon to assess goals progression <30days   [] Goals require adjustment due to lack of progress  [] Patient is not progressing as expected and requires additional follow up with physician  [] Other    Prognosis for POC: [x] Good [] Fair  [] Poor      Patient requires continued skilled intervention: [x] Yes  [] No    Treatment/Activity Tolerance:  [x] Patient able to complete treatment  [] Patient limited by fatigue  [] Patient limited by pain     [] Patient limited by other medical complications  [x] Other:  3/29: Pt demos improved mobility and tolerance to movement following session today. Pt able to progress strengthening exercises with no increase LBP. Pt educated on increasing aerobic endurance at home and perform exercises taught in session today. Continue POC and progressive dynamic activities. Patient education:      Prognosis: [x] Good [] Fair  [] Poor    Patient Requires Follow-up: [x] Yes  [] No    PLAN:   [x] Continue per plan of care [] Alter current plan (see comments)  [] Plan of care initiated [] Hold pending MD visit [] Discharge    Electronically signed by: Mamta Schmidt, PT, DPT    Sam Liu SPT  Therapist was present, directed the patient's care, made skilled judgement, and was responsible for assessment and treatment of the patient. *If patient does not return for further follow ups after this date.  Please consider this as the patients discharge from physical therapy.

## 2022-04-01 ENCOUNTER — HOSPITAL ENCOUNTER (OUTPATIENT)
Dept: PHYSICAL THERAPY | Age: 32
Setting detail: THERAPIES SERIES
Discharge: HOME | End: 2022-04-01
Payer: COMMERCIAL

## 2022-04-01 PROCEDURE — 97140 MANUAL THERAPY 1/> REGIONS: CPT

## 2022-04-01 PROCEDURE — 97110 THERAPEUTIC EXERCISES: CPT

## 2022-04-01 NOTE — FLOWSHEET NOTE
The [address] [place] [address],[address], [address], [address] [place] [address]  Phone: [phone]   Fax:     [phone]    Physical Therapy Daily Treatment Note  Date:  2022    Patient Name:  Willo Denver. :  1990  MRN: [id number]  Restrictions/Precautions:    Medical/Treatment Diagnosis Information:  Diagnosis: S39.012D (ICD-10-CM) - Strain of lumbar region, subsequent encounter  Treatment Diagnosis: S39.012D (ICD-10-CM) - Strain of lumbar region, subsequent encounter  Insurance/Certification information:  PT Insurance Information: [facility]  Physician Information:  Referring Practitioner: Isa Shore PA-C  Has the plan of care been signed (Y/N):        []  Yes  [x]  No     Date of Patient follow up with Physician:       Is this a Progress Report:     []  Yes  [x]  No        If Yes:  Date Range for reporting period:  Beginning 3/25/22  Ending    Progress report will be due (10 Rx or 30 days whichever is less):        Recertification will be due (POC Duration  / 90 days whichever is less):          Visit # Insurance Allowable Auth Required   3    1-2xweek for 4-6 weeks [x]  Yes []  No        Functional Scale: [place]; 48% deficit   Date assessed:  3/25/22     Latex Allergy:  [x]NO      []YES  Preferred Language for Healthcare:   [x]English       []other:      Pain level:  6/10   4/1     SUBJECTIVE:   Patient notes pain in lower back and stiffness, mildly upset with the decreased activity. Patient indicates feeling good at night following treatment but wakes up with stiffness. Patients reports adhering to HEP and feels good following treatment.  Overall, patient notes global improvement of function since the start of PT.      OBJECTIVE:   ROM  3/25   Comments   Trunk flexion To mid anterior tibia + tightness present    Trunk extension 50% impaired    Trunk R sidebend To mid lateral thigh + tightness present    Trunk L sidebend WNL    Trunk R rotation WNL    Trunk L rotation WNL    HS flexibility                        Strength  3/25 Left Right Comments   Hip flexion(L2) 5 4+    Knee extension(L3) 5 5    Knee flexion(S1-2) 5 5    Ankle dorsiflexion(L4) 5 5    Toe extension(L5) 5 5    Ankle eversion/plantar flexion(S1) 5 5    Hip abd             Special tests 3/25   Comments   SLR      Slump test      Pelvic symmetry  equal     Segmental Spinal mobility      Heel walk negative     Toe walk negative     Tandem walk                 DTRs Left Right Comments   Patellar(L3-L4) = =     Achilles(S1-S2) = =                  Joint mobility: 3/25              [x]Normal               []Hypo              []Hyper     Palpation: TTP along lower R lumbar paraspinals   3/25     Functional Mobility/Transfers: Independent with increased pain with ADL's; difficulty donning and doffing socks and shoes; unable to perform full duty at work; unable to lift and carry heavy objects; unable to work out or participate in boxing; unable to ambulate for an extended period fo time; difficulty sleeping   3/25     Posture: WNL  3/25     Gait: (include devices/WB status) Antalgic gait present  3/25                           [x] Patient history, allergies, meds reviewed. Medical chart reviewed. See intake form. 3/25     Review Of Systems (ROS):   [x]Performed Review of systems (Integumentary, CardioPulmonary, Neurological) by intake and observation. Intake form has been scanned into medical record. Patient has been instructed to contact their primary care physician regarding ROS issues if not already being addressed at this time.   3/25     RESTRICTIONS/PRECAUTIONS:     Exercises/Interventions: BILATERAL  ROM/stretches/Mobility     SKTC 10 x 2 (after manual stretch of Hams) (w/5 sec hold) 4/1   DKTC 10 sec x 10    Child Pose w/ side bend  2 X 15 4/1   Prayer stretch to Cobra 2 X 15  4/1    Supine HS 10 sec x 10    Thread the needles 2 X 15   b/l 4/1   Pelvic tilt     Toe Touches  3 X 10   (added contract/relax 5 second hold into extension) 4/1   Split Stance Spinal Rotations 2 x 15  b/l 4/1    Hamstring   1 x 2 mins   b/l 4/1        Hook lying rotation 20x each  (following manual rotation) 4/1   Cat and camel          Strengthening     SLR     Quadruped alternate UE reaches     Upright Bike 7 mins 4/1   Goblet Squat 8 lbs 10 X 1  (defer due to stiffness)    Education Resume light intense strengthening training at home and start running/walking program. Increase HEP performance and increase physical activity. 4/1   Forward/Reverse  Lunges 2 X 15 each  B/l each  4/1   Quadruped alternate LE reaches     Quadruped alternate UE/LE reaches     Ball squats     Ball heel raises     Sit ups      planks     Tband lat pulls     Tband rows         Manual Intervention             Manual Hams. Stretch (5 sec. CR) 2 min X 1  b/l   4/1   Lumbar rotation In SL 2 X 2 min   4/1   Prone PA 1 min   4/1   GISTM/STM 8 min  4/1   Lumbar Manip      SI Manip      Hip belt mobs      Hip LA distraction              Therapeutic Exercise and NMR EXR  [x] (62430) Provided verbal/tactile cueing for activities related to strengthening, flexibility, endurance, ROM  for improvements in proximal hip and core control with self care, mobility, lifting and ambulation.  [] (33006) Provided verbal/tactile cueing for activities related to improving balance, coordination, kinesthetic sense, posture, motor skill, proprioception  to assist with core control in self care, mobility, lifting, and ambulation.      Therapeutic Activities:    [] (50984 or 58727) Provided verbal/tactile cueing for activities related to improving balance, coordination, kinesthetic sense, posture, motor skill, proprioception and motor activation to allow for proper function  with self care and ADLs  [] (92759) Provided training and instruction to the patient for proper core and proximal hip recruitment and positioning with ambulation re-education Home Exercise Program:    [x] (91341) Reviewed/Progressed HEP activities related to strengthening, flexibility, endurance, ROM of core, proximal hip and LE for functional self-care, mobility, lifting and ambulation   [] (00293) Reviewed/Progressed HEP activities related to improving balance, coordination, kinesthetic sense, posture, motor skill, proprioception of core, proximal hip and LE for self care, mobility, lifting, and ambulation      Manual Treatments:  PROM / STM / Oscillations-Mobs:  G-I, II, III, IV (PA's, Inf., Post.)  [x] (48007) Provided manual therapy to mobilize proximal hip and LS spine soft tissue/joints for the purpose of modulating pain, promoting relaxation,  increasing ROM, reducing/eliminating soft tissue swelling/inflammation/restriction, improving soft tissue extensibility and allowing for proper ROM for normal function with self care, mobility, lifting and ambulation. Modalities:       Charges:  Timed Code Treatment Minutes: 60   Total Treatment Minutes: 68  3:07-4:15      3:07-3:50   43 min 3TE  3:50- 4:07 17 min  MAN   4:07-4:15  8 min education/scheduling        [] EVAL (LOW) 04874 (typically 20 minutes face-to-face)  [] EVAL (MOD) 98464 (typically 30 minutes face-to-face)  [] EVAL (HIGH) 15489 (typically 45 minutes face-to-face)  [] RE-EVAL     [x] MR(91033) x 3    [] IONTO  [] NMR (13467) x     [] VASO  [x] Manual (52153) x 1     [] Other:  [] TA x      [] Mech Traction (04411)  [] ES(attended) (22969)      [] ES (un) (75835):     Goals:   Patient stated goal: Return to work; Return to working out and boxing  [] Progressing: [] Met: [] Not Met: [] Adjusted    Therapist goals for Patient:   Short Term Goals: To be achieved in: 2 weeks  1. Independent in HEP and progression per patient tolerance, in order to prevent re-injury. [] Progressing: [] Met: [] Not Met: [] Adjusted   2.  Patient will have a decrease in pain to facilitate improvement in movement, function, and ADLs as indicated by Functional Deficits. [] Progressing: [] Met: [] Not Met: [] Adjusted    Long Term Goals: To be achieved in: 4-6 weeks  1. Disability index score of 25% or less for the [place] to assist with reaching prior level of function. [] Progressing: [] Met: [] Not Met: [] Adjusted  2. Patient will demonstrate increased AROM to WNL, good LS mobility, good hip ROM to allow for proper joint functioning as indicated by patients Functional Deficits. [] Progressing: [] Met: [] Not Met: [] Adjusted  3. Patient will demonstrate an increase in Strength to good proximal hip and core activation to allow for proper functional mobility as indicated by patients Functional Deficits. [] Progressing: [] Met: [] Not Met: [] Adjusted  4. Patient will return to Independent functional activities without increased symptoms or restriction. [] Progressing: [] Met: [] Not Met: [] Adjusted  5. Patient will report returning to gym with no restrictions. [] Progressing: [] Met: [] Not Met: [] Adjusted     Overall Progression Towards Functional goals/ Treatment Progress Update:  [] Patient is progressing as expected towards functional goals listed. [] Progression is slowed due to complexities/Impairments listed. [] Progression has been slowed due to co-morbidities. [x] Plan just implemented, too soon to assess goals progression <30days   [] Goals require adjustment due to lack of progress  [] Patient is not progressing as expected and requires additional follow up with physician  [] Other    Prognosis for POC: [x] Good [] Fair  [] Poor      Patient requires continued skilled intervention: [x] Yes  [] No    Treatment/Activity Tolerance:  [x] Patient able to complete treatment  [] Patient limited by fatigue  [] Patient limited by pain     [] Patient limited by other medical complications  [x] Other:  4/1: Patient tolerated treatment well this session.  Patient presented with increased mobility and  decreased symptoms following treatment today. Patient continues to exhibit stiffness in hamstrings and low back with trigger point irritation in the erector spinae around T12-L3 region. Patient reported mild frustration upon start of treatment today but with education on treatment understands the rehab process. Patient expressed general happiness with process moving forward and plan to return to high level of activity. Follow up after weekend exercises and progress strengthening next session. Patient education: Educated to initiate running this weekend and increase exercises. Patient educated on performing exercises in the morning when symptoms are higher to prep for the day. Prognosis: [x] Good [] Fair  [] Poor    Patient Requires Follow-up: [x] Yes  [] No    PLAN:   [x] Continue per plan of care [] Alter current plan (see comments)  [] Plan of care initiated [] Hold pending MD visit [] Discharge    Electronically signed by: Franklin Coy PT, DPVERONICA Isidro  Therapist was present, directed the patient's care, made skilled judgement, and was responsible for assessment and treatment of the patient. *If patient does not return for further follow ups after this date. Please consider this as the patients discharge from physical therapy.

## 2022-04-04 ENCOUNTER — HOSPITAL ENCOUNTER (OUTPATIENT)
Dept: PHYSICAL THERAPY | Age: 32
Setting detail: THERAPIES SERIES
Discharge: HOME | End: 2022-04-04
Payer: COMMERCIAL

## 2022-04-04 PROCEDURE — 97110 THERAPEUTIC EXERCISES: CPT

## 2022-04-04 PROCEDURE — 97112 NEUROMUSCULAR REEDUCATION: CPT

## 2022-04-04 NOTE — FLOWSHEET NOTE
The [address] [place] [address],[address], [address], [address] [place] [address]  Phone: [phone]   Fax:     [phone]    Physical Therapy Daily Treatment Note  Date:  2022    Patient Name:  Matt Sutherland. :  1990  MRN: [id number]  Restrictions/Precautions:    Medical/Treatment Diagnosis Information:  Diagnosis: S39.012D (ICD-10-CM) - Strain of lumbar region, subsequent encounter  Treatment Diagnosis: S39.012D (ICD-10-CM) - Strain of lumbar region, subsequent encounter  Insurance/Certification information:  PT Insurance Information: [facility]  Physician Information:  Referring Practitioner: Skylar Santiago PA-C  Has the plan of care been signed (Y/N):        []  Yes  [x]  No     Date of Patient follow up with Physician:       Is this a Progress Report:     []  Yes  [x]  No        If Yes:  Date Range for reporting period:  Beginning 3/25/22  Ending    Progress report will be due (10 Rx or 30 days whichever is less):        Recertification will be due (POC Duration  / 90 days whichever is less):          Visit # Insurance Allowable Auth Required   4    1-2xweek for 4-6 weeks [x]  Yes []  No        Functional Scale: [place]; 48% deficit   Date assessed:  3/25/22     Latex Allergy:  [x]NO      []YES  Preferred Language for Healthcare:   [x]English       []other:      Pain level:  4/10   4/4     SUBJECTIVE:   Patient states that he feels fine today. He states that he went to the mall with his kids yesterday and he was having a bad day. He states that the previous visit aggravated his legs and he was really sore and felt like they wanted to lock up. He states that he has good days and bad days and not matter what stretches he does if it is a bad day it continues and he cant seem to change the symptoms.      OBJECTIVE:   ROM  3/25   Comments   Trunk flexion To mid anterior tibia + tightness present    Trunk extension 50% impaired    Trunk R sidebend To mid lateral thigh + tightness present    Trunk L sidebend WNL    Trunk R rotation WNL    Trunk L rotation WNL    HS flexibility                        Strength  3/25 Left Right Comments   Hip flexion(L2) 5 4+    Knee extension(L3) 5 5    Knee flexion(S1-2) 5 5    Ankle dorsiflexion(L4) 5 5    Toe extension(L5) 5 5    Ankle eversion/plantar flexion(S1) 5 5    Hip abd             Special tests 3/25   Comments   SLR      Slump test      Pelvic symmetry  equal     Segmental Spinal mobility      Heel walk negative     Toe walk negative     Tandem walk                 DTRs Left Right Comments   Patellar(L3-L4) = =     Achilles(S1-S2) = =                  Joint mobility: 3/25              [x]Normal               []Hypo              []Hyper     Palpation: TTP along lower R lumbar paraspinals   3/25     Functional Mobility/Transfers: Independent with increased pain with ADL's; difficulty donning and doffing socks and shoes; unable to perform full duty at work; unable to lift and carry heavy objects; unable to work out or participate in boxing; unable to ambulate for an extended period fo time; difficulty sleeping   3/25     Posture: WNL  3/25     Gait: (include devices/WB status) Antalgic gait present  3/25                           [x] Patient history, allergies, meds reviewed. Medical chart reviewed. See intake form. 3/25     Review Of Systems (ROS):   [x]Performed Review of systems (Integumentary, CardioPulmonary, Neurological) by intake and observation. Intake form has been scanned into medical record. Patient has been instructed to contact their primary care physician regarding ROS issues if not already being addressed at this time.   3/25     RESTRICTIONS/PRECAUTIONS:     Exercises/Interventions: BILATERAL  ROM/stretches/Mobility     SKTC 10 x 2 (after manual stretch of Hams) (w/5 sec hold) 4/4   DKTC 10 sec x 10    Child Pose w/ side bend  2 X 15    Prayer stretch to Cobra 2 X 15  4/4   Supine HS 10 sec x 10    Thread the needles 2 X 15   b/l 4/4   Pelvic tilt     Toe Touches  2 X 10    4/4   Split Stance Spinal Rotations 2 x 15  b/l    [employer] Great[employer] Stretch  2 x 15 b/l  4/4   Hamstring   2 x 1 mins   b/l 4/4        Hook lying rotation 20x each  (following manual rotation)    Cat and camel  2 X 15 4/4        Strengthening     SLR     Shoulder Press (combo with goblet) 3 x 10 b/l 25 lbs  4/4   Leg Sled  3 x 10 (140,180,220 lbs)  Start 220 next session 4/4         Quadruped alternate UE reaches     Upright Bike 7 mins 4/4   Goblet Squat 3 x 10 #25 lbs   4/4   Education Continue to educate on light intense strengthening training at home and running/walking program. Increase HEP performance and increase physical activity. 4/4   Reverse  Lunges 1 X 15 each  b/l each  4/4   Leg Press      Quadruped alternate LE reaches     Quadruped alternate UE/LE reaches     Ball squats     Ball heel raises     Sit ups      planks     Tband lat pulls     Tband rows         Manual Intervention             Manual Hams. Stretch (5 sec. CR) 2 min X 1  b/l      Lumbar rotation In SL 2 X 2 min      Prone PA 1 min      GISTM/STM 8 min     Lumbar Manip      SI Manip      Hip belt mobs      Hip LA distraction              Therapeutic Exercise and NMR EXR  [x] (62928) Provided verbal/tactile cueing for activities related to strengthening, flexibility, endurance, ROM  for improvements in proximal hip and core control with self care, mobility, lifting and ambulation. [x] (57491) Provided verbal/tactile cueing for activities related to improving balance, coordination, kinesthetic sense, posture, motor skill, proprioception  to assist with core control in self care, mobility, lifting, and ambulation.      Therapeutic Activities:    [] (25837 or 37619) Provided verbal/tactile cueing for activities related to improving balance, coordination, kinesthetic sense, posture, motor skill, proprioception and motor activation to allow for proper function with self care and ADLs  [] (54498) Provided training and instruction to the patient for proper core and proximal hip recruitment and positioning with ambulation re-education     Home Exercise Program:    [x] (27847) Reviewed/Progressed HEP activities related to strengthening, flexibility, endurance, ROM of core, proximal hip and LE for functional self-care, mobility, lifting and ambulation   [] (77036) Reviewed/Progressed HEP activities related to improving balance, coordination, kinesthetic sense, posture, motor skill, proprioception of core, proximal hip and LE for self care, mobility, lifting, and ambulation      Manual Treatments:  PROM / STM / Oscillations-Mobs:  G-I, II, III, IV (PA's, Inf., Post.)  [x] (49518) Provided manual therapy to mobilize proximal hip and LS spine soft tissue/joints for the purpose of modulating pain, promoting relaxation,  increasing ROM, reducing/eliminating soft tissue swelling/inflammation/restriction, improving soft tissue extensibility and allowing for proper ROM for normal function with self care, mobility, lifting and ambulation. Modalities:       Charges:  Timed Code Treatment Minutes: 60   Total Treatment Minutes: 65  2:30-3:35      2:30-3:07 TE 37 mins  3:07-3:12: rest  3:12-3:35: 23 NM and education       [] EVAL (LOW) 78822 (typically 20 minutes face-to-face)  [] EVAL (MOD) 37997 (typically 30 minutes face-to-face)  [] EVAL (HIGH) 05736 (typically 45 minutes face-to-face)  [] RE-EVAL     [x] LF(87597) x 3   [] IONTO  [x] NMR (51115) x 1     [] VASO  [] Manual (72284) x     [] Other:  [] TA x      [] Mech Traction (43773)  [] ES(attended) (13650)      [] ES (un) (12072):     Goals:   Patient stated goal: Return to work; Return to working out and boxing  [] Progressing: [] Met: [] Not Met: [] Adjusted    Therapist goals for Patient:   Short Term Goals: To be achieved in: 2 weeks  1. Independent in HEP and progression per patient tolerance, in order to prevent re-injury. [] Progressing: [] Met: [] Not Met: [] Adjusted   2. Patient will have a decrease in pain to facilitate improvement in movement, function, and ADLs as indicated by Functional Deficits. [] Progressing: [] Met: [] Not Met: [] Adjusted    Long Term Goals: To be achieved in: 4-6 weeks  1. Disability index score of 25% or less for the [place] to assist with reaching prior level of function. [] Progressing: [] Met: [] Not Met: [] Adjusted  2. Patient will demonstrate increased AROM to WNL, good LS mobility, good hip ROM to allow for proper joint functioning as indicated by patients Functional Deficits. [] Progressing: [] Met: [] Not Met: [] Adjusted  3. Patient will demonstrate an increase in Strength to good proximal hip and core activation to allow for proper functional mobility as indicated by patients Functional Deficits. [] Progressing: [] Met: [] Not Met: [] Adjusted  4. Patient will return to Independent functional activities without increased symptoms or restriction. [] Progressing: [] Met: [] Not Met: [] Adjusted  5. Patient will report returning to gym with no restrictions. [] Progressing: [] Met: [] Not Met: [] Adjusted     Overall Progression Towards Functional goals/ Treatment Progress Update:  [] Patient is progressing as expected towards functional goals listed. [] Progression is slowed due to complexities/Impairments listed. [] Progression has been slowed due to co-morbidities.   [x] Plan just implemented, too soon to assess goals progression <30days   [] Goals require adjustment due to lack of progress  [] Patient is not progressing as expected and requires additional follow up with physician  [] Other    Prognosis for POC: [x] Good [] Fair  [] Poor      Patient requires continued skilled intervention: [x] Yes  [] No    Treatment/Activity Tolerance:  [x] Patient able to complete treatment  [] Patient limited by fatigue  [] Patient limited by pain     [] Patient limited by other medical complications  [x] Other:  4/4: Patient able to progress to strengthening exercise with improved symptoms. Continue POC and progressive strengthening. Patient education: Educated to continue to increase mobility and strengthening exercises. Encouraged to perform aerobic endurance. Prognosis: [x] Good [] Fair  [] Poor    Patient Requires Follow-up: [x] Yes  [] No    PLAN:   [x] Continue per plan of care [] Alter current plan (see comments)  [] Plan of care initiated [] Hold pending MD visit [] Discharge    Electronically signed by: Koki Nix PT, DPT    Mat Porter, SPT  Therapist was present, directed the patient's care, made skilled judgement, and was responsible for assessment and treatment of the patient. *If patient does not return for further follow ups after this date. Please consider this as the patients discharge from physical therapy.

## 2022-04-05 ENCOUNTER — HOSPITAL ENCOUNTER (OUTPATIENT)
Dept: PHYSICAL THERAPY | Age: 32
Setting detail: THERAPIES SERIES
Discharge: HOME | End: 2022-04-05
Payer: COMMERCIAL

## 2022-04-05 NOTE — FLOWSHEET NOTE
Physical Therapy  Cancellation/No-show Note  Patient Name:  Jossie Rosales.   :  1990   Date:  2022  Cancelled visits to date:   No-shows to date: 0    For today's appointment patient:  [x]  Cancelled  []  Rescheduled appointment  []  No-show     Reason given by patient:  []  Patient ill  []  Conflicting appointment  []  No transportation    [x]  Conflict with work  []  No reason given  []  Other:     Comments:      Electronically signed by:  Suraj Zepeda, PT, DPT

## 2022-04-08 ENCOUNTER — HOSPITAL ENCOUNTER (OUTPATIENT)
Dept: PHYSICAL THERAPY | Age: 32
Setting detail: THERAPIES SERIES
Discharge: HOME | End: 2022-04-08
Payer: COMMERCIAL

## 2022-04-08 PROCEDURE — 97110 THERAPEUTIC EXERCISES: CPT

## 2022-04-08 NOTE — FLOWSHEET NOTE
The [address] [facility] and 500 [facility], [address] [place] [address] Drive [address] , [address] [place] [address]  Phone: [phone]   Fax:     [phone]    Physical Therapy Daily Treatment Note  Date:  2022    Patient Name:  Andrew Polanco :  1990  MRN: [id number]  Restrictions/Precautions:    Medical/Treatment Diagnosis Information:  Diagnosis: S39.012D (ICD-10-CM) - Strain of lumbar region, subsequent encounter  Treatment Diagnosis: S39.012D (ICD-10-CM) - Strain of lumbar region, subsequent encounter  Insurance/Certification information:  PT Insurance Information: [facility]  Physician Information:  Referring Practitioner: Rhonda Diallo PA-C  Has the plan of care been signed (Y/N):        []  Yes  [x]  No     Date of Patient follow up with Physician:       Is this a Progress Report:     []  Yes  [x]  No        If Yes:  Date Range for reporting period:  Beginning 3/25/22  Ending    Progress report will be due (10 Rx or 30 days whichever is less):        Recertification will be due (POC Duration  / 90 days whichever is less):          Visit # Insurance Allowable Auth Required   5    1-2xweek for 4-6 weeks [x]  Yes []  No        Functional Scale: [place]; 48% deficit   Date assessed:  3/25/22     Latex Allergy:  [x]NO      []YES  Preferred Language for Healthcare:   [x]English       []other:      Pain level:  0/10        SUBJECTIVE:  : Patient repost improving symptoms and mobility this past week. He notes reduced stiffness in the morning and less frequent irration. He continues to perform HEP and is ready to increase activity.       OBJECTIVE:   ROM  3/25   Comments   Trunk flexion To mid anterior tibia + tightness present    Trunk extension 50% impaired    Trunk R sidebend To mid lateral thigh + tightness present    Trunk L sidebend WNL    Trunk R rotation WNL    Trunk L rotation WNL    HS flexibility                        Strength  3/25 Left Right Comments Hip flexion(L2) 5 4+    Knee extension(L3) 5 5    Knee flexion(S1-2) 5 5    Ankle dorsiflexion(L4) 5 5    Toe extension(L5) 5 5    Ankle eversion/plantar flexion(S1) 5 5    Hip abd             Special tests 3/25   Comments   SLR      Slump test      Pelvic symmetry  equal     Segmental Spinal mobility      Heel walk negative     Toe walk negative     Tandem walk                 DTRs Left Right Comments   Patellar(L3-L4) = =     Achilles(S1-S2) = =                  Joint mobility: 3/25              [x]Normal               []Hypo              []Hyper     Palpation: TTP along lower R lumbar paraspinals   3/25     Functional Mobility/Transfers: Independent with increased pain with ADL's; difficulty donning and doffing socks and shoes; unable to perform full duty at work; unable to lift and carry heavy objects; unable to work out or participate in boxing; unable to ambulate for an extended period fo time; difficulty sleeping   3/25     Posture: WNL  3/25     Gait: (include devices/WB status) Antalgic gait present  3/25                           [x] Patient history, allergies, meds reviewed. Medical chart reviewed. See intake form. 3/25     Review Of Systems (ROS):   [x]Performed Review of systems (Integumentary, CardioPulmonary, Neurological) by intake and observation. Intake form has been scanned into medical record. Patient has been instructed to contact their primary care physician regarding ROS issues if not already being addressed at this time.   3/25     RESTRICTIONS/PRECAUTIONS:     Exercises/Interventions: BILATERAL  ROM/stretches/Mobility     SKTC 10 x 2 (after manual stretch of Hams) (w/5 sec hold)    DKTC 10 sec x 10    Child Pose w/ side bend  2 X 15    Prayer stretch to Cobra 2 X 15     Supine HS 10 sec x 10    Thread the needles 2 X 15   b/l    Pelvic tilt     Toe Touches  2 X 10       Split Stance Spinal Rotations 2 x 15  b/l    World Greatest Stretch  2 x 15 b/l     Hamstring   3 x 45 seconds  b/l 4/8 Hook lying rotation 20x each  (following manual rotation)    Cat and camel  2 X 15         Circuit (3)     Backward cable walks 3 x 10 (#8) 4/8   Dumbbell Row 3 x 10 (20 lbs) 4/8   Leg Sled 3 x 10 (220 lbs) 4/8        Circuit (2)     Goblet Squat  3 x 10 (30 lbs) 4/8   Cable Rows 3 x 10 (#5) 4/8        Strengthening     SLR     Shoulder Press (combo with goblet) 3 x 10 b/l 25 lbs     Leg Sled  3 x 10 (140,180,220 lbs)  Start 220 next session         Quadruped alternate UE reaches     Upright Bike 5 mins 4/8   Planks (forward and sides) 2 x 30 secs   4/8   Education Continue to educate on light intense strengthening training at home and running/walking program. Increase HEP performance and increase physical activity. 4/8    Reverse  Lunges 1 X 15 each  b/l each     Leg Press      Quadruped alternate LE reaches     Quadruped alternate UE/LE reaches     Ball squats     Ball heel raises     Sit ups      planks     Tband lat pulls     Tband rows         Manual Intervention             Manual Hams. Stretch (5 sec. CR) 2 min X 1  b/l      Lumbar rotation In SL 2 X 2 min      Prone PA 1 min      GISTM/STM 8 min     Lumbar Manip      SI Manip      Hip belt mobs      Hip LA distraction              Therapeutic Exercise and NMR EXR  [x] (33268) Provided verbal/tactile cueing for activities related to strengthening, flexibility, endurance, ROM  for improvements in proximal hip and core control with self care, mobility, lifting and ambulation. [x] (94526) Provided verbal/tactile cueing for activities related to improving balance, coordination, kinesthetic sense, posture, motor skill, proprioception  to assist with core control in self care, mobility, lifting, and ambulation.      Therapeutic Activities:    [] (09220 or 37235) Provided verbal/tactile cueing for activities related to improving balance, coordination, kinesthetic sense, posture, motor skill, proprioception and motor activation to allow for proper function with self care and ADLs  [] (94436) Provided training and instruction to the patient for proper core and proximal hip recruitment and positioning with ambulation re-education     Home Exercise Program:    [x] (91115) Reviewed/Progressed HEP activities related to strengthening, flexibility, endurance, ROM of core, proximal hip and LE for functional self-care, mobility, lifting and ambulation   [] (02771) Reviewed/Progressed HEP activities related to improving balance, coordination, kinesthetic sense, posture, motor skill, proprioception of core, proximal hip and LE for self care, mobility, lifting, and ambulation      Manual Treatments:  PROM / STM / Oscillations-Mobs:  G-I, II, III, IV (PA's, Inf., Post.)  [x] (80728) Provided manual therapy to mobilize proximal hip and LS spine soft tissue/joints for the purpose of modulating pain, promoting relaxation,  increasing ROM, reducing/eliminating soft tissue swelling/inflammation/restriction, improving soft tissue extensibility and allowing for proper ROM for normal function with self care, mobility, lifting and ambulation. Modalities:       Charges:  Timed Code Treatment Minutes: 45   Total Treatment Minutes: 53  3:06-3:59      3:10- 3:55  45 mins  3TE           [] EVAL (LOW) 70656 (typically 20 minutes face-to-face)  [] EVAL (MOD) 29674 (typically 30 minutes face-to-face)  [] EVAL (HIGH) 82796 (typically 45 minutes face-to-face)  [] RE-EVAL     [x] FF(12463) x 3   [] IONTO  [] NMR (29722) x      [] VASO  [] Manual (91097) x     [] Other:  [] TA x      [] Mech Traction (68827)  [] ES(attended) (29165)      [] ES (un) (33719):     Goals:   Patient stated goal: Return to work; Return to working out and boxing  [] Progressing: [] Met: [] Not Met: [] Adjusted    Therapist goals for Patient:   Short Term Goals: To be achieved in: 2 weeks  1. Independent in HEP and progression per patient tolerance, in order to prevent re-injury.      [] Progressing: [] Met: [] Not Met: [] Adjusted   2. Patient will have a decrease in pain to facilitate improvement in movement, function, and ADLs as indicated by Functional Deficits. [] Progressing: [] Met: [] Not Met: [] Adjusted    Long Term Goals: To be achieved in: 4-6 weeks  1. Disability index score of 25% or less for the [place] to assist with reaching prior level of function. [] Progressing: [] Met: [] Not Met: [] Adjusted  2. Patient will demonstrate increased AROM to WNL, good LS mobility, good hip ROM to allow for proper joint functioning as indicated by patients Functional Deficits. [] Progressing: [] Met: [] Not Met: [] Adjusted  3. Patient will demonstrate an increase in Strength to good proximal hip and core activation to allow for proper functional mobility as indicated by patients Functional Deficits. [] Progressing: [] Met: [] Not Met: [] Adjusted  4. Patient will return to Independent functional activities without increased symptoms or restriction. [] Progressing: [] Met: [] Not Met: [] Adjusted  5. Patient will report returning to gym with no restrictions. [] Progressing: [] Met: [] Not Met: [] Adjusted     Overall Progression Towards Functional goals/ Treatment Progress Update:  [] Patient is progressing as expected towards functional goals listed. [] Progression is slowed due to complexities/Impairments listed. [] Progression has been slowed due to co-morbidities.   [x] Plan just implemented, too soon to assess goals progression <30days   [] Goals require adjustment due to lack of progress  [] Patient is not progressing as expected and requires additional follow up with physician  [] Other    Prognosis for POC: [x] Good [] Fair  [] Poor      Patient requires continued skilled intervention: [x] Yes  [] No    Treatment/Activity Tolerance:  [x] Patient able to complete treatment  [] Patient limited by fatigue  [] Patient limited by pain     [] Patient limited by other medical complications  [x] Other:  4/8: Patient able to progress to multiple joint strengthening with minimal discomfort. Patient educated to continue to perform aerobic endurance at home and start light shadow boxing. Patient encouraged by recent progress but still mildly hesitant with certain movements. Plan to continue progress strengthening and monitor at home exercise program. Discuss further at home gym exercises and program structure. Patient education: Educated to continue to increase mobility and strengthening exercises. Encouraged to perform aerobic endurance. Prognosis: [x] Good [] Fair  [] Poor    Patient Requires Follow-up: [x] Yes  [] No    PLAN:   [x] Continue per plan of care [] Alter current plan (see comments)  [] Plan of care initiated [] Hold pending MD visit [] Discharge    Electronically signed by: Spring Marks PT, DPT    VERONICA Goldstein  Therapist was present, directed the patient's care, made skilled judgement, and was responsible for assessment and treatment of the patient. *If patient does not return for further follow ups after this date. Please consider this as the patients discharge from physical therapy.

## 2022-04-11 ENCOUNTER — HOSPITAL ENCOUNTER (OUTPATIENT)
Dept: PHYSICAL THERAPY | Age: 32
Setting detail: THERAPIES SERIES
Discharge: HOME | End: 2022-04-11
Payer: COMMERCIAL

## 2022-04-11 PROCEDURE — 97530 THERAPEUTIC ACTIVITIES: CPT

## 2022-04-11 PROCEDURE — 97110 THERAPEUTIC EXERCISES: CPT

## 2022-04-11 NOTE — FLOWSHEET NOTE
The [facility] and [address] [place] [address], [facility] [address] [facility], [address] [place] [address]  Phone: [phone]   Fax:     [phone]    Physical Therapy Daily Treatment Note  Date:  2022    Patient Name:  Osmel Crisostomo :  1990  MRN: [id number]  Restrictions/Precautions:    Medical/Treatment Diagnosis Information:  Diagnosis: S39.012D (ICD-10-CM) - Strain of lumbar region, subsequent encounter  Treatment Diagnosis: S39.012D (ICD-10-CM) - Strain of lumbar region, subsequent encounter  Insurance/Certification information:  PT Insurance Information: [id number]  Physician Information:  Referring Practitioner: Susannah Damico PA-C  Has the plan of care been signed (Y/N):        []  Yes  [x]  No     Date of Patient follow up with Physician:       Is this a Progress Report:     []  Yes  [x]  No        If Yes:  Date Range for reporting period:  Beginning 3/25/22  Ending    Progress report will be due (10 Rx or 30 days whichever is less):        Recertification will be due (POC Duration  / 90 days whichever is less):          Visit # Insurance Allowable Auth Required   6   1-2xweek for 4-6 weeks [x]  Yes []  No        Functional Scale: [place]; 48% deficit   Date assessed:  3/25/22     Latex Allergy:  [x]NO      []YES  Preferred Language for Healthcare:   [x]English       []other:      Pain level:  0/10        SUBJECTIVE:  : Patient notes able to go to the gym yesterday to perform UE exercise with some hesitation in LB but no pain. He notes he will continue to increase activity that has be prescribed and educated by PT team. He notes some soreness overall but improving function.       OBJECTIVE:   ROM  3/25   Comments   Trunk flexion To mid anterior tibia + tightness present    Trunk extension 50% impaired    Trunk R sidebend To mid lateral thigh + tightness present    Trunk L sidebend WNL    Trunk R rotation WNL    Trunk L rotation WNL    HS flexibility                        Strength  3/25 Left Right Comments   Hip flexion(L2) 5 4+    Knee extension(L3) 5 5    Knee flexion(S1-2) 5 5    Ankle dorsiflexion(L4) 5 5    Toe extension(L5) 5 5    Ankle eversion/plantar flexion(S1) 5 5    Hip abd             Special tests 3/25   Comments   SLR      Slump test      Pelvic symmetry  equal     Segmental Spinal mobility      Heel walk negative     Toe walk negative     Tandem walk                 DTRs Left Right Comments   Patellar(L3-L4) = =     Achilles(S1-S2) = =                  Joint mobility: 3/25              [x]Normal               []Hypo              []Hyper     Palpation: TTP along lower R lumbar paraspinals   3/25     Functional Mobility/Transfers: Independent with increased pain with ADL's; difficulty donning and doffing socks and shoes; unable to perform full duty at work; unable to lift and carry heavy objects; unable to work out or participate in boxing; unable to ambulate for an extended period fo time; difficulty sleeping   3/25     Posture: WNL  3/25     Gait: (include devices/WB status) Antalgic gait present  3/25                           [x] Patient history, allergies, meds reviewed. Medical chart reviewed. See intake form. 3/25     Review Of Systems (ROS):   [x]Performed Review of systems (Integumentary, CardioPulmonary, Neurological) by intake and observation. Intake form has been scanned into medical record. Patient has been instructed to contact their primary care physician regarding ROS issues if not already being addressed at this time.   3/25     RESTRICTIONS/PRECAUTIONS:     Exercises/Interventions: BILATERAL  ROM/stretches/Mobility     SKTC 10 x 2 (after manual stretch of Hams) (w/5 sec hold)    DKTC 10 sec x 10    Child Pose w/ side bend  2 X 15    Prayer stretch to Cobra 3 X 10 4/11   Supine HS 10 sec x 10    Thread the needles 2 X 15   b/l    Pelvic tilt     Toe Touches  3 X 10    4/11   Split Stance Spinal Rotations 2 x 15  b/l 4/11   World Greatest Stretch  2 x 15 b/l     Hamstring   3 x 45 seconds  b/l         Hook lying rotation 20x each  (following manual rotation)    Cat and camel  2 X 15         Circuit (3)     Backward cable walks 3 x 10 (#8)    Dumbbell Row 3 x 10 (20 lbs)    Leg Sled 3 x 10 (220 lbs)         Circuit (2)     Goblet Squat  3 x 10 (30 lbs)    Cable Rows 3 x 10 (#5)         Strengthening     SLR     Shoulder Press (combo with goblet) 3 x 10 b/l 25 lbs     Leg Sled  3 x 10 (240 lbs) 4/11   Functional Squat floor  and Press/Reach  2 x 2 mins (20, 25, 30 lbs dumbbells) 4/11   Functional Farmer Carries  2 x 2 min 30 sec (20, 25, 30 lbs dumbbell) 4/11   Quadruped alternate UE reaches     Upright Bike 6 mins 4/11   Bird dogs  3 x 12  4/11   Planks (forward and sides) 2 x 30 secs      Education Continue to educate on light intense strengthening training at home and running/walking program. Increase HEP performance and increase physical activity. 4/11   Reverse  Lunges 1 X 15 each  b/l each     Leg Press      Quadruped alternate LE reaches     Quadruped alternate UE/LE reaches     Ball squats     Ball heel raises     Sit ups      planks     Tband lat pulls     Tband rows         Manual Intervention             Manual Hams. Stretch (5 sec. CR) 2 min X 1  b/l      Lumbar rotation In SL 2 X 2 min      Prone PA 1 min      GISTM/STM 8 min     Lumbar Manip      SI Manip      Hip belt mobs      Hip LA distraction              Therapeutic Exercise and NMR EXR  [x] (82360) Provided verbal/tactile cueing for activities related to strengthening, flexibility, endurance, ROM  for improvements in proximal hip and core control with self care, mobility, lifting and ambulation. [x] (48328) Provided verbal/tactile cueing for activities related to improving balance, coordination, kinesthetic sense, posture, motor skill, proprioception  to assist with core control in self care, mobility, lifting, and ambulation.      Therapeutic Activities:    [x] (70847 or 54742) Provided verbal/tactile cueing for activities related to improving balance, coordination, kinesthetic sense, posture, motor skill, proprioception and motor activation to allow for proper function  with self care and ADLs  [] (35579) Provided training and instruction to the patient for proper core and proximal hip recruitment and positioning with ambulation re-education     Home Exercise Program:    [x] (41167) Reviewed/Progressed HEP activities related to strengthening, flexibility, endurance, ROM of core, proximal hip and LE for functional self-care, mobility, lifting and ambulation   [] (03820) Reviewed/Progressed HEP activities related to improving balance, coordination, kinesthetic sense, posture, motor skill, proprioception of core, proximal hip and LE for self care, mobility, lifting, and ambulation      Manual Treatments:  PROM / STM / Oscillations-Mobs:  G-I, II, III, IV (PA's, Inf., Post.)  [x] (28785) Provided manual therapy to mobilize proximal hip and LS spine soft tissue/joints for the purpose of modulating pain, promoting relaxation,  increasing ROM, reducing/eliminating soft tissue swelling/inflammation/restriction, improving soft tissue extensibility and allowing for proper ROM for normal function with self care, mobility, lifting and ambulation.      Modalities:       Charges:  Timed Code Treatment Minutes: 50   Total Treatment Minutes: 3:17-4:10  53 minutes      3:20-3:55 35 min  2TE  3:55-4:10  15 min  TA           [] EVAL (LOW) 97335 (typically 20 minutes face-to-face)  [] EVAL (MOD) 39272 (typically 30 minutes face-to-face)  [] EVAL (HIGH) 12038 (typically 45 minutes face-to-face)  [] RE-EVAL     [x] NV(14438) x 2  [] IONTO  [] NMR (52825) x      [] VASO  [] Manual (96395) x     [] Other:  [x] TA x 1   [] Mech Traction (40165)  [] ES(attended) (48369)      [] ES (un) (52940):     Goals:   Patient stated goal: Return to work; Return to working out and boxing  [] Progressing: [] Met: [] Not Met: [] Adjusted    Therapist goals for Patient:   Short Term Goals: To be achieved in: 2 weeks  1. Independent in HEP and progression per patient tolerance, in order to prevent re-injury. [] Progressing: [] Met: [] Not Met: [] Adjusted   2. Patient will have a decrease in pain to facilitate improvement in movement, function, and ADLs as indicated by Functional Deficits. [] Progressing: [] Met: [] Not Met: [] Adjusted    Long Term Goals: To be achieved in: 4-6 weeks  1. Disability index score of 25% or less for the [place] to assist with reaching prior level of function. [] Progressing: [] Met: [] Not Met: [] Adjusted  2. Patient will demonstrate increased AROM to WNL, good LS mobility, good hip ROM to allow for proper joint functioning as indicated by patients Functional Deficits. [] Progressing: [] Met: [] Not Met: [] Adjusted  3. Patient will demonstrate an increase in Strength to good proximal hip and core activation to allow for proper functional mobility as indicated by patients Functional Deficits. [] Progressing: [] Met: [] Not Met: [] Adjusted  4. Patient will return to Independent functional activities without increased symptoms or restriction. [] Progressing: [] Met: [] Not Met: [] Adjusted  5. Patient will report returning to gym with no restrictions. [] Progressing: [] Met: [] Not Met: [] Adjusted     Overall Progression Towards Functional goals/ Treatment Progress Update:  [] Patient is progressing as expected towards functional goals listed. [] Progression is slowed due to complexities/Impairments listed. [] Progression has been slowed due to co-morbidities.   [x] Plan just implemented, too soon to assess goals progression <30days   [] Goals require adjustment due to lack of progress  [] Patient is not progressing as expected and requires additional follow up with physician  [] Other    Prognosis for POC: [x] Good [] Fair  [] Poor      Patient requires continued skilled intervention: [x] Yes  [] No    Treatment/Activity Tolerance:  [x] Patient able to complete treatment  [] Patient limited by fatigue  [] Patient limited by pain     [] Patient limited by other medical complications  [x] Other:  4/11: Patient able to progress to functional carrying tasks with minimal discomfort with fatigued within 2 minutes of activity. Continue to progress functional strengthening and endurance activities to assimilate work based task and encourage HEP involving UE and aerobics. Patient education: Educated to continue to increase mobility and strengthening exercises. Encouraged to perform aerobic endurance. Prognosis: [x] Good [] Fair  [] Poor    Patient Requires Follow-up: [x] Yes  [] No    PLAN:   [x] Continue per plan of care [] Alter current plan (see comments)  [] Plan of care initiated [] Hold pending MD visit [] Discharge    Electronically signed by: Gloria Moser PT, DPT    VERONICA Scrhoeder  Therapist was present, directed the patient's care, made skilled judgement, and was responsible for assessment and treatment of the patient. *If patient does not return for further follow ups after this date. Please consider this as the patients discharge from physical therapy.

## 2022-04-12 ENCOUNTER — HOSPITAL ENCOUNTER (OUTPATIENT)
Dept: PHYSICAL THERAPY | Age: 32
Setting detail: THERAPIES SERIES
Discharge: HOME | End: 2022-04-12
Payer: COMMERCIAL

## 2022-04-12 PROCEDURE — 97110 THERAPEUTIC EXERCISES: CPT

## 2022-04-12 PROCEDURE — 97140 MANUAL THERAPY 1/> REGIONS: CPT

## 2022-04-12 NOTE — FLOWSHEET NOTE
The [facility] and [address] [place] [address], [facility] [address], [address] [place] [address]  Phone: [phone]   Fax:     [phone]    Physical Therapy Daily Treatment Note  Date:  2022    Patient Name:  Jossie Rosales. :  1990  MRN: [id number]  Restrictions/Precautions:    Medical/Treatment Diagnosis Information:  Diagnosis: S39.012D (ICD-10-CM) - Strain of lumbar region, subsequent encounter  Treatment Diagnosis: S39.012D (ICD-10-CM) - Strain of lumbar region, subsequent encounter  Insurance/Certification information:  PT Insurance Information: [facility]  Physician Information:  Referring Practitioner: Curt Lewis PA-C  Has the plan of care been signed (Y/N):        []  Yes  [x]  No     Date of Patient follow up with Physician:       Is this a Progress Report:     []  Yes  [x]  No        If Yes:  Date Range for reporting period:  Beginning 3/25/22  Ending    Progress report will be due (10 Rx or 30 days whichever is less):        Recertification will be due (POC Duration  / 90 days whichever is less):          Visit # Insurance Allowable Auth Required   7   1-2xweek for 4-6 weeks [x]  Yes []  No        Functional Scale: [place]; 48% deficit   Date assessed:  3/25/22     Latex Allergy:  [x]NO      []YES  Preferred Language for Healthcare:   [x]English       []other:      Pain level:  0-2/10        SUBJECTIVE:  : Patient reports feeling great after session yesterday, he went home and performed some pull-ups with some discomfort. He notes not performing morning exercises and had to stand 6 plus hours today at work which caused aggravation of back. Patient notes not being discouraged though understanding rehab process and views this as a positive note for endurance. Following treatment, he felt \"stretched out\" and loose.       OBJECTIVE:   ROM  3/25   Comments   Trunk flexion To mid anterior tibia + tightness present    Trunk extension 50% impaired    Trunk R sidebend To mid lateral thigh + tightness present    Trunk L sidebend WNL    Trunk R rotation WNL    Trunk L rotation WNL    HS flexibility                        Strength  3/25 Left Right Comments   Hip flexion(L2) 5 4+    Knee extension(L3) 5 5    Knee flexion(S1-2) 5 5    Ankle dorsiflexion(L4) 5 5    Toe extension(L5) 5 5    Ankle eversion/plantar flexion(S1) 5 5    Hip abd             Special tests 3/25   Comments   SLR      Slump test      Pelvic symmetry  equal     Segmental Spinal mobility      Heel walk negative     Toe walk negative     Tandem walk                 DTRs Left Right Comments   Patellar(L3-L4) = =     Achilles(S1-S2) = =                  Joint mobility: 3/25              [x]Normal               []Hypo              []Hyper     Palpation: TTP along lower R lumbar paraspinals   3/25     Functional Mobility/Transfers: Independent with increased pain with ADL's; difficulty donning and doffing socks and shoes; unable to perform full duty at work; unable to lift and carry heavy objects; unable to work out or participate in boxing; unable to ambulate for an extended period fo time; difficulty sleeping   3/25     Posture: WNL  3/25     Gait: (include devices/WB status) Antalgic gait present  3/25                           [x] Patient history, allergies, meds reviewed. Medical chart reviewed. See intake form. 3/25     Review Of Systems (ROS):   [x]Performed Review of systems (Integumentary, CardioPulmonary, Neurological) by intake and observation. Intake form has been scanned into medical record. Patient has been instructed to contact their primary care physician regarding ROS issues if not already being addressed at this time.   3/25     RESTRICTIONS/PRECAUTIONS:     Exercises/Interventions: BILATERAL  ROM/stretches/Mobility     SKTC 10 x 2 (after manual stretch of Hams) (w/5 sec hold)    DKTC 10 sec x 10    Child Pose w/ side bend  2 X 15 4/12   Prayer stretch to Cobra 2 X 10 4/12   Leg kick to hamstring stretch 1 x 10  4/12   Thread the needles 2 X 15   b/l 4/12   Pelvic tilt     Toe Touches  3 X 10       Split Stance Spinal Rotations 2 x 15  b/l    World Greatest Stretch  2 x 15 b/l     Hamstring   3 x 45 seconds  b/l    Prone press ups (combo with STM and PA mob of lumbar) 1 x 10  4/12   Hook lying rotation 20x each  (following manual rotation) 4/12   Cat and camel  2 X 15 4/12        Dynamic Stretches     Lateral walking hurdles  2 laps 4/12   Open/Close handley 2 laps 4/12             Circuit (3)     Backward cable walks 3 x 10 (#8)    Dumbbell Row 3 x 10 (20 lbs)    Leg Sled 3 x 10 (220 lbs)         Circuit (2)     Goblet Squat  3 x 10 (30 lbs)    Cable Rows 3 x 10 (#5)         Strengthening     SLR     Shoulder Press (combo with goblet) 3 x 10 b/l 25 lbs     Leg Sled  3 x 10 (240 lbs)    Functional Squat floor  and Press/Reach  2 x 2 mins (20, 25, 30 lbs dumbbells)    Functional Farmer Carries  2 x 2 min 30 sec (20, 25, 30 lbs dumbbell)    Quadruped alternate UE reaches     Upright Bike 6 mins    Bird dogs  3 x 12     Planks (forward and sides) 2 x 30 secs      Education Continue to educate on light intense strengthening training at home and running/walking program. Increase HEP performance and increase physical activity. 4/12   Reverse  Lunges 1 X 15 each  b/l each     Quadruped alternate LE reaches     Quadruped alternate UE/LE reaches     Ball squats     Ball heel raises     Sit ups      planks     Tband lat pulls     Tband rows         Manual Intervention             Manual Hams. Stretch (5 sec.  CR) 2 min X 1  b/l  4/12    Lumbar rotation In SL 2 X 2 min  4/12    Prone PA 4 min  4/12    GISTM/STM 10 min 4/12    Lumbar Manip      SI Manip      Hip belt mobs      Hip LA distraction              Therapeutic Exercise and NMR EXR  [x] (68678) Provided verbal/tactile cueing for activities related to strengthening, flexibility, endurance, ROM  for improvements in proximal hip and core control with self care, mobility, lifting and ambulation. [x] (75324) Provided verbal/tactile cueing for activities related to improving balance, coordination, kinesthetic sense, posture, motor skill, proprioception  to assist with core control in self care, mobility, lifting, and ambulation. Therapeutic Activities:    [] (88557 or 84329) Provided verbal/tactile cueing for activities related to improving balance, coordination, kinesthetic sense, posture, motor skill, proprioception and motor activation to allow for proper function  with self care and ADLs  [] (68562) Provided training and instruction to the patient for proper core and proximal hip recruitment and positioning with ambulation re-education     Home Exercise Program:    [x] (78283) Reviewed/Progressed HEP activities related to strengthening, flexibility, endurance, ROM of core, proximal hip and LE for functional self-care, mobility, lifting and ambulation   [] (98144) Reviewed/Progressed HEP activities related to improving balance, coordination, kinesthetic sense, posture, motor skill, proprioception of core, proximal hip and LE for self care, mobility, lifting, and ambulation      Manual Treatments:  PROM / STM / Oscillations-Mobs:  G-I, II, III, IV (PA's, Inf., Post.)  [x] (87094) Provided manual therapy to mobilize proximal hip and LS spine soft tissue/joints for the purpose of modulating pain, promoting relaxation,  increasing ROM, reducing/eliminating soft tissue swelling/inflammation/restriction, improving soft tissue extensibility and allowing for proper ROM for normal function with self care, mobility, lifting and ambulation.      Modalities:       Charges:  Timed Code Treatment Minutes: 45   Total Treatment Minutes: 3:20-4:07  47 minutes      3:20-3:40 20 TE  3:40-4:05 25 min  2Manual            [] EVAL (LOW) 06166 (typically 20 minutes face-to-face)  [] EVAL (MOD) 19689 (typically 30 minutes face-to-face)  [] EVAL (HIGH) 20938 (typically 45 minutes face-to-face)  [] RE-EVAL     [x] VS(23794) x 1  [] IONTO  [] NMR (53822) x      [] VASO  [x] Manual (03401) x 2     [] Other:  [] TA x    [] Mech Traction (30979)  [] ES(attended) (81156)      [] ES (un) (23298):     Goals:   Patient stated goal: Return to work; Return to working out and boxing  [] Progressing: [] Met: [] Not Met: [] Adjusted    Therapist goals for Patient:   Short Term Goals: To be achieved in: 2 weeks  1. Independent in HEP and progression per patient tolerance, in order to prevent re-injury. [] Progressing: [] Met: [] Not Met: [] Adjusted   2. Patient will have a decrease in pain to facilitate improvement in movement, function, and ADLs as indicated by Functional Deficits. [] Progressing: [] Met: [] Not Met: [] Adjusted    Long Term Goals: To be achieved in: 4-6 weeks  1. Disability index score of 25% or less for the [place] to assist with reaching prior level of function. [] Progressing: [] Met: [] Not Met: [] Adjusted  2. Patient will demonstrate increased AROM to WNL, good LS mobility, good hip ROM to allow for proper joint functioning as indicated by patients Functional Deficits. [] Progressing: [] Met: [] Not Met: [] Adjusted  3. Patient will demonstrate an increase in Strength to good proximal hip and core activation to allow for proper functional mobility as indicated by patients Functional Deficits. [] Progressing: [] Met: [] Not Met: [] Adjusted  4. Patient will return to Independent functional activities without increased symptoms or restriction. [] Progressing: [] Met: [] Not Met: [] Adjusted  5. Patient will report returning to gym with no restrictions. [] Progressing: [] Met: [] Not Met: [] Adjusted     Overall Progression Towards Functional goals/ Treatment Progress Update:  [] Patient is progressing as expected towards functional goals listed. [] Progression is slowed due to complexities/Impairments listed.   [] Progression has been slowed due to co-morbidities. [x] Plan just implemented, too soon to assess goals progression <30days   [] Goals require adjustment due to lack of progress  [] Patient is not progressing as expected and requires additional follow up with physician  [] Other    Prognosis for POC: [x] Good [] Fair  [] Poor      Patient requires continued skilled intervention: [x] Yes  [] No    Treatment/Activity Tolerance:  [x] Patient able to complete treatment  [] Patient limited by fatigue  [] Patient limited by pain     [] Patient limited by other medical complications  [x] Other:  4/12: Deferred strengthening exercise today to allow muscle recover following yesterday strengthening session. Patient demos improving tolerance to mobility exercise and exhibited improve ROM. Patient reported feeling better and end of session. Next session plan to resume strength training and attempt treadmill running trial.     Patient education: Educated to continue to increase mobility and strengthening exercises. Encouraged to perform aerobic endurance. Prognosis: [x] Good [] Fair  [] Poor    Patient Requires Follow-up: [x] Yes  [] No    PLAN:   [x] Continue per plan of care [] Alter current plan (see comments)  [] Plan of care initiated [] Hold pending MD visit [] Discharge    Electronically signed by: Linda Don PT, DPT    VERONICA Lawson  Therapist was present, directed the patient's care, made skilled judgement, and was responsible for assessment and treatment of the patient. *If patient does not return for further follow ups after this date. Please consider this as the patients discharge from physical therapy.

## 2022-04-15 ENCOUNTER — HOSPITAL ENCOUNTER (OUTPATIENT)
Dept: PHYSICAL THERAPY | Age: 32
Setting detail: THERAPIES SERIES
Discharge: HOME | End: 2022-04-15
Payer: COMMERCIAL

## 2022-04-15 PROCEDURE — 97110 THERAPEUTIC EXERCISES: CPT

## 2022-04-15 PROCEDURE — 97530 THERAPEUTIC ACTIVITIES: CPT

## 2022-04-15 PROCEDURE — 97140 MANUAL THERAPY 1/> REGIONS: CPT

## 2022-04-15 NOTE — FLOWSHEET NOTE
[facility] and [address] [place] [address], [facility] [address] , [address] [place] [address]  Phone: [phone]   Fax:     [phone]    Physical Therapy Daily Treatment Note  Date:  4/15/2022    Patient Name:  Alejandro Cunningham. :  1990  MRN: [id number]  Restrictions/Precautions:    Medical/Treatment Diagnosis Information:  Diagnosis: S39.012D (ICD-10-CM) - Strain of lumbar region, subsequent encounter  Treatment Diagnosis: S39.012D (ICD-10-CM) - Strain of lumbar region, subsequent encounter  Insurance/Certification information:  PT Insurance Information: [facility]  Physician Information:  Referring Practitioner: Rashad Brown PA-C  Has the plan of care been signed (Y/N):        []  Yes  [x]  No     Date of Patient follow up with Physician:       Is this a Progress Report:     []  Yes  [x]  No        If Yes:  Date Range for reporting period:  Beginning 3/25/22  Ending    Progress report will be due (10 Rx or 30 days whichever is less):        Recertification will be due (POC Duration  / 90 days whichever is less):          Visit # Insurance Allowable Auth Required   8  4/15 1-2xweek for 4-6 weeks [x]  Yes []  No        Functional Scale: [place]; 48% deficit   Date assessed:  3/25/22     Latex Allergy:  [x]NO      []YES  Preferred Language for Healthcare:   [x]English       []other:      Pain level:  0-8/10    4/15     SUBJECTIVE:  4/15: Patient reports back locking up twice during work day. He notes performing some UE exercises last night and feels tender today. He notes feeling good though with whole rehab process and understanding muscle soreness. HEP has been beneficial to reduce symptoms.       OBJECTIVE:   ROM  3/25   Comments   Trunk flexion To mid anterior tibia + tightness present    Trunk extension 50% impaired    Trunk R sidebend To mid lateral thigh + tightness present    Trunk L sidebend WNL    Trunk R rotation WNL    Trunk L rotation WNL    HS flexibility                        Strength  3/25 Left Right Comments   Hip flexion(L2) 5 4+    Knee extension(L3) 5 5    Knee flexion(S1-2) 5 5    Ankle dorsiflexion(L4) 5 5    Toe extension(L5) 5 5    Ankle eversion/plantar flexion(S1) 5 5    Hip abd             Special tests 3/25   Comments   SLR      Slump test      Pelvic symmetry  equal     Segmental Spinal mobility      Heel walk negative     Toe walk negative     Tandem walk                 DTRs Left Right Comments   Patellar(L3-L4) = =     Achilles(S1-S2) = =                  Joint mobility: 3/25              [x]Normal               []Hypo              []Hyper     Palpation: TTP along lower R lumbar paraspinals   3/25     Functional Mobility/Transfers: Independent with increased pain with ADL's; difficulty donning and doffing socks and shoes; unable to perform full duty at work; unable to lift and carry heavy objects; unable to work out or participate in boxing; unable to ambulate for an extended period fo time; difficulty sleeping   3/25     Posture: WNL  3/25     Gait: (include devices/WB status) Antalgic gait present  3/25                           [x] Patient history, allergies, meds reviewed. Medical chart reviewed. See intake form. 3/25     Review Of Systems (ROS):   [x]Performed Review of systems (Integumentary, CardioPulmonary, Neurological) by intake and observation. Intake form has been scanned into medical record. Patient has been instructed to contact their primary care physician regarding ROS issues if not already being addressed at this time.   3/25     RESTRICTIONS/PRECAUTIONS:     Exercises/Interventions: BILATERAL  ROM/stretches/Mobility     SKTC 10 x 2  4/15   DKTC 10 sec x 10    Child Pose w/ side bend  2 X 15    Prayer stretch to Cobra 2 X 10 4/15   Leg kick to hamstring stretch 1 x 10     Thread the needles 2 X 15   b/l 4/15   Seated Lumbar Rotation w/ Piriformis stretch 2 x 10 (3 sec hold each) 4/15   Toe Touches  5 CR x 1  2 x 10 with side walks    4/15   Split Stance Spinal Rotations 2 x 15  b/l    World Greatest Stretch  2 x 15 b/l     Hamstring   3 x 30 seconds  b/l 4/15   Prone press ups (combo with STM and PA mob of lumbar) 1 x 10     Hook lying rotation 20x each  (following manual rotation)    Cat and camel  2 X 15         Dynamic Stretches     Lateral walking hurdles  2 laps    Open/Close handley 2 laps              Circuit (3)     Backward cable walks 3 x 10 (#8)    Dumbbell Row 3 x 10 (20 lbs)    Leg Sled 3 x 10 (220 lbs)         Circuit (2)     Goblet Squat  3 x 10 (30 lbs)    Cable Rows 3 x 10 (#5)         Strengthening     Single leg bridge  3 x 10 b/l    Shoulder Press (combo with goblet) 3 x 10 b/l 25 lbs     Leg Sled  3 x 10 (240 lbs)    Functional Squat floor  and Press/Reach  2 x 2 mins (20, 25, 30 lbs dumbbells)    Functional Farmer Carries  2 x 2 min 30 sec (20, 25, 30 lbs dumbbell)    Quadruped alternate UE reaches     Upright Bike 8 mins 4/15   Bird dogs  3 x 12     Planks (forward and sides) 2 x 30 secs      Education Continue to educate on light intense strengthening training at home and running/walking program. Increase HEP performance and increase physical activity. 4/15   Reverse  Lunges 1 X 15 each  b/l each     Quadruped alternate LE reaches     Quadruped alternate UE/LE reaches     Ball squats     Ball heel raises     Sit ups  2 x 10  4/15   Table Top the straight leg 2 x 10 4/15   Tband lat pulls     Tband rows         Manual Intervention             Manual Hams. Stretch (5 sec.  CR) 2 min X 1  b/l      Lumbar rotation In SL 2 X 2 min      Prone PA 4 min  4/15    GISTM/STM 10 min 4/15    Lumbar Manip      SI Manip      Hip belt mobs      Hip LA distraction              Therapeutic Exercise and NMR EXR  [x] (44786) Provided verbal/tactile cueing for activities related to strengthening, flexibility, endurance, ROM  for improvements in proximal hip and core control with self care, mobility, lifting and ambulation. [x] (57890) Provided verbal/tactile cueing for activities related to improving balance, coordination, kinesthetic sense, posture, motor skill, proprioception  to assist with core control in self care, mobility, lifting, and ambulation. Therapeutic Activities:    [x] (10720 or 24147) Provided verbal/tactile cueing for activities related to improving balance, coordination, kinesthetic sense, posture, motor skill, proprioception and motor activation to allow for proper function  with self care and ADLs  [x] (01864) Provided training and instruction to the patient for proper core and proximal hip recruitment and positioning with ambulation re-education     Home Exercise Program:    [x] (42848) Reviewed/Progressed HEP activities related to strengthening, flexibility, endurance, ROM of core, proximal hip and LE for functional self-care, mobility, lifting and ambulation   [] (18044) Reviewed/Progressed HEP activities related to improving balance, coordination, kinesthetic sense, posture, motor skill, proprioception of core, proximal hip and LE for self care, mobility, lifting, and ambulation      Manual Treatments:  PROM / STM / Oscillations-Mobs:  G-I, II, III, IV (PA's, Inf., Post.)  [x] (72845) Provided manual therapy to mobilize proximal hip and LS spine soft tissue/joints for the purpose of modulating pain, promoting relaxation,  increasing ROM, reducing/eliminating soft tissue swelling/inflammation/restriction, improving soft tissue extensibility and allowing for proper ROM for normal function with self care, mobility, lifting and ambulation.      Modalities:       Charges:  Timed Code Treatment Minutes: 55   Total Treatment Minutes: 3:18-4:15  57 minutes      3:20-3:45 25 2 TE  3:45-4:00 15 min TA  4:00-4:15 15 min  Manual            [] EVAL (LOW) 43214 (typically 20 minutes face-to-face)  [] EVAL (MOD) 68563 (typically 30 minutes face-to-face)  [] EVAL (HIGH) 36538 (typically 45 minutes face-to-face)  [] RE-EVAL     [x] UJ(97256) x 2  [] IONTO  [] NMR (37424) x      [] VASO  [x] Manual (04725) x 1    [] Other:  [x] TA x 1    [] Mech Traction (29181)  [] ES(attended) (05623)      [] ES (un) (32200):     Goals:   Patient stated goal: Return to work; Return to working out and boxing  [] Progressing: [] Met: [] Not Met: [] Adjusted    Therapist goals for Patient:   Short Term Goals: To be achieved in: 2 weeks  1. Independent in HEP and progression per patient tolerance, in order to prevent re-injury. [] Progressing: [] Met: [] Not Met: [] Adjusted   2. Patient will have a decrease in pain to facilitate improvement in movement, function, and ADLs as indicated by Functional Deficits. [] Progressing: [] Met: [] Not Met: [] Adjusted    Long Term Goals: To be achieved in: 4-6 weeks  1. Disability index score of 25% or less for the [place] to assist with reaching prior level of function. [] Progressing: [] Met: [] Not Met: [] Adjusted  2. Patient will demonstrate increased AROM to WNL, good LS mobility, good hip ROM to allow for proper joint functioning as indicated by patients Functional Deficits. [] Progressing: [] Met: [] Not Met: [] Adjusted  3. Patient will demonstrate an increase in Strength to good proximal hip and core activation to allow for proper functional mobility as indicated by patients Functional Deficits. [] Progressing: [] Met: [] Not Met: [] Adjusted  4. Patient will return to Independent functional activities without increased symptoms or restriction. [] Progressing: [] Met: [] Not Met: [] Adjusted  5. Patient will report returning to gym with no restrictions. [] Progressing: [] Met: [] Not Met: [] Adjusted     Overall Progression Towards Functional goals/ Treatment Progress Update:  [] Patient is progressing as expected towards functional goals listed. [] Progression is slowed due to complexities/Impairments listed.   [] Progression has been slowed due to co-morbidities. [x] Plan just implemented, too soon to assess goals progression <30days   [] Goals require adjustment due to lack of progress  [] Patient is not progressing as expected and requires additional follow up with physician  [] Other    Prognosis for POC: [x] Good [] Fair  [] Poor      Patient requires continued skilled intervention: [x] Yes  [] No    Treatment/Activity Tolerance:  [x] Patient able to complete treatment  [] Patient limited by fatigue  [] Patient limited by pain     [] Patient limited by other medical complications  [x] Other:  4/15: Patient demos fatigue and reduced endurance following 3rd session of the week plus HEP program. 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 Patient requires lifting of objects >100 lbs so continue to progress towards functional tasks. Patient education: Educated to continue to increase mobility and strengthening exercises. Encouraged to perform aerobic endurance. Prognosis: [x] Good [] Fair  [] Poor    Patient Requires Follow-up: [x] Yes  [] No    PLAN:   [x] Continue per plan of care [] Alter current plan (see comments)  [] Plan of care initiated [] Hold pending MD visit [] Discharge    Electronically signed by: Allen Cannon, PT, DPT    Zeny Ndiaye, SPT  Therapist was present, directed the patient's care, made skilled judgement, and was responsible for assessment and treatment of the patient. *If patient does not return for further follow ups after this date. Please consider this as the patients discharge from physical therapy.

## 2022-04-18 ENCOUNTER — HOSPITAL ENCOUNTER (OUTPATIENT)
Dept: PHYSICAL THERAPY | Age: 32
Setting detail: THERAPIES SERIES
Discharge: HOME | End: 2022-04-18
Payer: COMMERCIAL

## 2022-04-18 PROCEDURE — 97530 THERAPEUTIC ACTIVITIES: CPT

## 2022-04-18 PROCEDURE — 97112 NEUROMUSCULAR REEDUCATION: CPT

## 2022-04-18 PROCEDURE — 97110 THERAPEUTIC EXERCISES: CPT

## 2022-04-18 NOTE — FLOWSHEET NOTE
The [facility] and [address] [place] [address], [address], [address] [place] [address]  Phone: [phone]   Fax:     [phone]    Physical Therapy Daily Treatment Note  Date:  2022    Patient Name:  Matt Sutherland.     :  1990  MRN: [id number]  Restrictions/Precautions:    Medical/Treatment Diagnosis Information:  Diagnosis: S39.012D (ICD-10-CM) - Strain of lumbar region, subsequent encounter  Treatment Diagnosis: S39.012D (ICD-10-CM) - Strain of lumbar region, subsequent encounter  Insurance/Certification information:  PT Insurance Information: [id number]  Physician Information:  Referring Practitioner: Deepak Burns PA-C  Has the plan of care been signed (Y/N):        []  Yes  [x]  No     Date of Patient follow up with Physician:       Is this a Progress Report:     []  Yes  [x]  No        If Yes:  Date Range for reporting period:  Beginning 3/25/22  Ending    Progress report will be due (10 Rx or 30 days whichever is less):        Recertification will be due (POC Duration  / 90 days whichever is less):          Visit # Insurance Allowable Auth Required   9   1-2xweek for 4-6 weeks [x]  Yes []  No        Functional Scale: [place]; 48% deficit   Date assessed:  3/25/22     Latex Allergy:  [x]NO      []YES  Preferred Language for Healthcare:   [x]English       []other:      Pain level:  0/10         SUBJECTIVE:  : Patient reports no locking up symptoms over the weekend and continues to increase HEP program. He continues to note some hesitance with large movements and still thinks about if movement will cause strain to LB.    OBJECTIVE:   ROM  3/25   Comments   Trunk flexion To mid anterior tibia + tightness present    Trunk extension 50% impaired    Trunk R sidebend To mid lateral thigh + tightness present    Trunk L sidebend WNL    Trunk R rotation WNL    Trunk L rotation WNL    HS flexibility                        Strength  3/25 Left Right Comments   Hip flexion(L2) 5 4+    Knee extension(L3) 5 5    Knee flexion(S1-2) 5 5    Ankle dorsiflexion(L4) 5 5    Toe extension(L5) 5 5    Ankle eversion/plantar flexion(S1) 5 5    Hip abd             Special tests 3/25   Comments   SLR      Slump test      Pelvic symmetry  equal     Segmental Spinal mobility      Heel walk negative     Toe walk negative     Tandem walk                 DTRs Left Right Comments   Patellar(L3-L4) = =     Achilles(S1-S2) = =                  Joint mobility: 3/25              [x]Normal               []Hypo              []Hyper     Palpation: TTP along lower R lumbar paraspinals   3/25     Functional Mobility/Transfers: Independent with increased pain with ADL's; difficulty donning and doffing socks and shoes; unable to perform full duty at work; unable to lift and carry heavy objects; unable to work out or participate in boxing; unable to ambulate for an extended period fo time; difficulty sleeping   3/25     Posture: WNL  3/25     Gait: (include devices/WB status) Antalgic gait present  3/25                           [x] Patient history, allergies, meds reviewed. Medical chart reviewed. See intake form. 3/25     Review Of Systems (ROS):   [x]Performed Review of systems (Integumentary, CardioPulmonary, Neurological) by intake and observation. Intake form has been scanned into medical record. Patient has been instructed to contact their primary care physician regarding ROS issues if not already being addressed at this time.   3/25     RESTRICTIONS/PRECAUTIONS:     Exercises/Interventions: BILATERAL  ROM/stretches/Mobility     SKTC 10 x 2     DKTC 10 sec x 10    Child Pose w/ side bend  2 X 15    Prayer stretch to Cobra 2 X 10    Leg kick to hamstring stretch 1 x 10     Thread the needles 2 X 15   b/l    Seated Lumbar Rotation w/ pigeon stretch 30 sec x 3  4/18   Toe Touches  5 CR x 1  2 x 10 with side walks       Split Stance Spinal Rotations 2 x 15  b/l    World [employer] system Stretch  2 x 15 b/l     Hamstring   5 x 30 seconds  b/l 4/18   Prone press ups (combo with STM and PA mob of lumbar) 1 x 10     Hook lying rotation 20x each  (following manual rotation)    Cat and camel  2 X 15         Dynamic Stretches     Lateral walking hurdles  2 laps    Open/Close handley 2 laps              Circuit (3)     RDLs 3 x 10 (barbell) 4/18   90/90 hip mobility 3 x 10  4/18   Child Pose side bends 3 x 10 (b/l) 4/18   Child pose to world greatest stretch 3 x 10 (b/l) 4/18        Strengthening     Single leg bridge  3 x 10 b/l    Shoulder Press (combo with goblet) 3 x 10 b/l 25 lbs     Leg Sled  3 x 10 (240 lbs)    Functional Squat floor  and Press/Reach  2 x 2 mins (20, 25, 30 lbs dumbbells)    Functional Farmer Carries  2 x 2 min 30 sec (20, 25, 30 lbs dumbbell)    Quadruped alternate UE reaches     elliptical  10 mins 4/18   Bird dogs  3 x 12     Planks (forward and sides) 2 x 30 secs      Education Continue to educate on light intense strengthening training at home and running/walking program. Increase HEP performance and increase physical activity. 4/18   Reverse  Lunges 1 X 15 each  b/l each     Quadruped alternate LE reaches     Quadruped alternate UE/LE reaches     Ball squats     Ball heel raises     Sit ups  2 x 10     Table Top the straight leg 2 x 10    Tband lat pulls     Tband rows         Manual Intervention             Manual Hams. Stretch (5 sec. CR) 2 min X 1  b/l      Lumbar rotation In SL 2 X 2 min      Prone PA 4 min  4/15    GISTM/STM 10 min 4/15    Lumbar Manip      SI Manip      Hip belt mobs      Hip LA distraction              Therapeutic Exercise and NMR EXR  [x] (08663) Provided verbal/tactile cueing for activities related to strengthening, flexibility, endurance, ROM  for improvements in proximal hip and core control with self care, mobility, lifting and ambulation.   [x] (07694) Provided verbal/tactile cueing for activities related to improving balance, coordination, kinesthetic sense, posture, motor skill, proprioception  to assist with core control in self care, mobility, lifting, and ambulation. Therapeutic Activities:    [x] (72307 or 93836) Provided verbal/tactile cueing for activities related to improving balance, coordination, kinesthetic sense, posture, motor skill, proprioception and motor activation to allow for proper function  with self care and ADLs  [x] (24242) Provided training and instruction to the patient for proper core and proximal hip recruitment and positioning with ambulation re-education     Home Exercise Program:    [x] (98522) Reviewed/Progressed HEP activities related to strengthening, flexibility, endurance, ROM of core, proximal hip and LE for functional self-care, mobility, lifting and ambulation   [] (01858) Reviewed/Progressed HEP activities related to improving balance, coordination, kinesthetic sense, posture, motor skill, proprioception of core, proximal hip and LE for self care, mobility, lifting, and ambulation      Manual Treatments:  PROM / STM / Oscillations-Mobs:  G-I, II, III, IV (PA's, Inf., Post.)  [x] (38559) Provided manual therapy to mobilize proximal hip and LS spine soft tissue/joints for the purpose of modulating pain, promoting relaxation,  increasing ROM, reducing/eliminating soft tissue swelling/inflammation/restriction, improving soft tissue extensibility and allowing for proper ROM for normal function with self care, mobility, lifting and ambulation.      Modalities:       Charges:  Timed Code Treatment Minutes: 55   Total Treatment Minutes: 3:20-4:25  65 minutes      3:20-3:50 30 2 TE  3:50-4:05 15 min 1 TA  4:05-4:15 10 min 1 NMR           [] EVAL (LOW) 31662 (typically 20 minutes face-to-face)  [] EVAL (MOD) 97559 (typically 30 minutes face-to-face)  [] EVAL (HIGH) 41605 (typically 45 minutes face-to-face)  [] RE-EVAL     [x] NT(65809) x 2  [] IONTO  [x] NMR (87607) x 1     [] VASO  [] Manual (75437) x [] Other:  [x] TA x 1    [] [facility] (06497)  [] ES(attended) (13620)      [] ES (un) (65668):     Goals:   Patient stated goal: Return to work; Return to working out and boxing  [] Progressing: [] Met: [] Not Met: [] Adjusted    Therapist goals for Patient:   Short Term Goals: To be achieved in: 2 weeks  1. Independent in HEP and progression per patient tolerance, in order to prevent re-injury. [] Progressing: [] Met: [] Not Met: [] Adjusted   2. Patient will have a decrease in pain to facilitate improvement in movement, function, and ADLs as indicated by Functional Deficits. [] Progressing: [] Met: [] Not Met: [] Adjusted    Long Term Goals: To be achieved in: 4-6 weeks  1. Disability index score of 25% or less for the [place] to assist with reaching prior level of function. [] Progressing: [] Met: [] Not Met: [] Adjusted  2. Patient will demonstrate increased AROM to WNL, good LS mobility, good hip ROM to allow for proper joint functioning as indicated by patients Functional Deficits. [] Progressing: [] Met: [] Not Met: [] Adjusted  3. Patient will demonstrate an increase in Strength to good proximal hip and core activation to allow for proper functional mobility as indicated by patients Functional Deficits. [] Progressing: [] Met: [] Not Met: [] Adjusted  4. Patient will return to Independent functional activities without increased symptoms or restriction. [] Progressing: [] Met: [] Not Met: [] Adjusted  5. Patient will report returning to gym with no restrictions. [] Progressing: [] Met: [] Not Met: [] Adjusted     Overall Progression Towards Functional goals/ Treatment Progress Update:  [] Patient is progressing as expected towards functional goals listed. [] Progression is slowed due to complexities/Impairments listed. [] Progression has been slowed due to co-morbidities.   [x] Plan just implemented, too soon to assess goals progression <30days   [] Goals require adjustment due to lack of progress  [] Patient is not progressing as expected and requires additional follow up with physician  [] Other    Prognosis for POC: [x] Good [] Fair  [] Poor      Patient requires continued skilled intervention: [x] Yes  [] No    Treatment/Activity Tolerance:  [x] Patient able to complete treatment  [] Patient limited by fatigue  [] Patient limited by pain     [] Patient limited by other medical complications  [x] Other:  4/18: Patient exhibits some hesitation and self mental limitations that continue to prolong recovery. Patient heavily motivated and educated on his improvements, functional gains, strength of back, and continued progress toward recovery. Continue to motivate and perform resistance strength exercises next session. Patient education: Educated to continue to increase mobility and strengthening exercises. Encouraged to perform aerobic endurance. Prognosis: [x] Good [] Fair  [] Poor    Patient Requires Follow-up: [x] Yes  [] No    PLAN:   [x] Continue per plan of care [] Alter current plan (see comments)  [] Plan of care initiated [] Hold pending MD visit [] Discharge    Electronically signed by: Amelie Myers, PT, DPT    VERONICA Kunz  Therapist was present, directed the patient's care, made skilled judgement, and was responsible for assessment and treatment of the patient. *If patient does not return for further follow ups after this date. Please consider this as the patients discharge from physical therapy.

## 2022-04-19 ENCOUNTER — HOSPITAL ENCOUNTER (OUTPATIENT)
Dept: PHYSICAL THERAPY | Age: 32
Setting detail: THERAPIES SERIES
Discharge: HOME | End: 2022-04-19
Payer: COMMERCIAL

## 2022-04-19 PROCEDURE — 97530 THERAPEUTIC ACTIVITIES: CPT

## 2022-04-19 PROCEDURE — 97110 THERAPEUTIC EXERCISES: CPT

## 2022-04-19 PROCEDURE — 97112 NEUROMUSCULAR REEDUCATION: CPT

## 2022-04-19 NOTE — FLOWSHEET NOTE
The [address], [facility] [address] [facility], [address] [place] [address]  Phone: [phone]   Fax:     [phone]    Physical Therapy Daily Treatment Note  Date:  2022    Patient Name:  Jossie Rosales. :  1990  MRN: [id number]  Restrictions/Precautions:    Medical/Treatment Diagnosis Information:  Diagnosis: S39.012D (ICD-10-CM) - Strain of lumbar region, subsequent encounter  Treatment Diagnosis: S39.012D (ICD-10-CM) - Strain of lumbar region, subsequent encounter  Insurance/Certification information:  PT Insurance Information: [id number]  Physician Information:  Referring Practitioner: Leticia Joseph PA-C  Has the plan of care been signed (Y/N):        []  Yes  [x]  No     Date of Patient follow up with Physician:       Is this a Progress Report:     []  Yes  [x]  No        If Yes:  Date Range for reporting period:  Beginning 3/25/22  Ending    Progress report will be due (10 Rx or 30 days whichever is less):        Recertification will be due (POC Duration  / 90 days whichever is less):          Visit # Insurance Allowable Auth Required   10   1-2xweek for 4-6 weeks [x]  Yes []  No        Functional Scale: [place]; 48% deficit   Date assessed:  3/25/22     Latex Allergy:  [x]NO      []YES  Preferred Language for Healthcare:   [x]English       []other:      Pain level:  0/10         SUBJECTIVE:   Patient states that he has mild soreness and discomfort today but overall improvement since last week.  He notes continued compliance with HEP and increasing UE gym program.     OBJECTIVE:   ROM  3/25   Comments   Trunk flexion To mid anterior tibia + tightness present    Trunk extension 50% impaired    Trunk R sidebend To mid lateral thigh + tightness present    Trunk L sidebend WNL    Trunk R rotation WNL    Trunk L rotation WNL    HS flexibility                        Strength  3/25 Left Right Comments   Hip flexion(L2) 5 4+    Knee extension(L3) 5 5    Knee flexion(S1-2) 5 5    Ankle dorsiflexion(L4) 5 5    Toe extension(L5) 5 5    Ankle eversion/plantar flexion(S1) 5 5    Hip abd             Special tests 3/25   Comments   SLR      Slump test      Pelvic symmetry  equal     Segmental Spinal mobility      Heel walk negative     Toe walk negative     Tandem walk                 DTRs Left Right Comments   Patellar(L3-L4) = =     Achilles(S1-S2) = =                  Joint mobility: 3/25              [x]Normal               []Hypo              []Hyper     Palpation: TTP along lower R lumbar paraspinals   3/25     Functional Mobility/Transfers: Independent with increased pain with ADL's; difficulty donning and doffing socks and shoes; unable to perform full duty at work; unable to lift and carry heavy objects; unable to work out or participate in boxing; unable to ambulate for an extended period fo time; difficulty sleeping   3/25     Posture: WNL  3/25     Gait: (include devices/WB status) Antalgic gait present  3/25                           [x] Patient history, allergies, meds reviewed. Medical chart reviewed. See intake form. 3/25     Review Of Systems (ROS):   [x]Performed Review of systems (Integumentary, CardioPulmonary, Neurological) by intake and observation. Intake form has been scanned into medical record. Patient has been instructed to contact their primary care physician regarding ROS issues if not already being addressed at this time.   3/25     RESTRICTIONS/PRECAUTIONS:     Exercises/Interventions: BILATERAL  ROM/stretches/Mobility     SKTC 20 sec holds b/l x 4 4/19    DKTC 10 sec x 10    Child Pose w/ side bend  2 X 15    Prayer stretch to Cobra 2 X 10    Butterfly Stretch 3 x 30 secs 4/19    Thread the needles 2 X 15   b/l    Seated Lumbar Rotation w/ pigeon stretch 30 sec x 3  4/19   Toe Touches  10 sec hold x 10   4/19   Split Stance Spinal Rotations 2 x 15  b/l    World Greatest Stretch  2 x 15 b/l     Hamstring   5 x 30 seconds  b/l    Prone press ups (combo with STM and PA mob of lumbar) 1 x 10     Hook lying rotation (leg over) 2 x 10  4/19    Cat and camel  2 X 15    Lateral walking hurdles  2 laps    Open/Close handley 2 laps    Alter G Treadmill Running Total 13 minutes (90% BW)  2 min run: 1 min walk  Range; 3.3 mph-5.5 mph 4/19    RDLs 3 x 10 (barbell)    90/90 hip mobility (seated)  Hip ER/IR Openers  3 x 10   3 x 10  4/19   Child Pose side bends 3 x 10 (b/l)    Seated SLR overs to heel slide  2 x 10 each  4/19         Strengthening     Single leg bridge  3 x 10 b/l    Shoulder Press (combo with goblet) 3 x 10 b/l 25 lbs     Leg Sled  3 x 10 (240 lbs)    Functional Squat floor  and Press/Reach  2 x 2 mins (20, 25, 30 lbs dumbbells)    Functional Farmer Carries  2 x 2 min 30 sec (20, 25, 30 lbs dumbbell)    Quadruped alternate UE reaches     elliptical  10 mins    Bird dogs  3 x 12     Planks (forward and sides) 2 x 30 secs      Education Continue to educate on light intense strengthening training at home and running/walking program. Increase HEP performance and increase physical activity. 4/19   Reverse  Lunges 1 X 15 each  b/l each     Quadruped alternate LE reaches     Quadruped alternate UE/LE reaches     Ball squats     Ball heel raises     Sit ups  2 x 10     Leg raise Hold (6 inches) 1 x 25 secs, 1 x 33 secs 4/19   Leg raises  10x  4/19    Tband rows         Manual Intervention             Manual Hams. Stretch (5 sec. CR) 2 min X 1  b/l      Lumbar rotation In SL 2 X 2 min      Prone PA 4 min      GISTM/STM 10 min     Lumbar Manip      SI Manip      Hip belt mobs      Hip LA distraction              Therapeutic Exercise and NMR EXR  [x] (59970) Provided verbal/tactile cueing for activities related to strengthening, flexibility, endurance, ROM  for improvements in proximal hip and core control with self care, mobility, lifting and ambulation.   [x] (45437) Provided verbal/tactile cueing for activities related to improving balance, coordination, kinesthetic sense, posture, motor skill, proprioception  to assist with core control in self care, mobility, lifting, and ambulation. Therapeutic Activities:    [x] (42014 or 66322) Provided verbal/tactile cueing for activities related to improving balance, coordination, kinesthetic sense, posture, motor skill, proprioception and motor activation to allow for proper function  with self care and ADLs  [x] (53200) Provided training and instruction to the patient for proper core and proximal hip recruitment and positioning with ambulation re-education     Home Exercise Program:    [x] (62366) Reviewed/Progressed HEP activities related to strengthening, flexibility, endurance, ROM of core, proximal hip and LE for functional self-care, mobility, lifting and ambulation   [] (64414) Reviewed/Progressed HEP activities related to improving balance, coordination, kinesthetic sense, posture, motor skill, proprioception of core, proximal hip and LE for self care, mobility, lifting, and ambulation      Manual Treatments:  PROM / STM / Oscillations-Mobs:  G-I, II, III, IV (PA's, Inf., Post.)  [] (11129) Provided manual therapy to mobilize proximal hip and LS spine soft tissue/joints for the purpose of modulating pain, promoting relaxation,  increasing ROM, reducing/eliminating soft tissue swelling/inflammation/restriction, improving soft tissue extensibility and allowing for proper ROM for normal function with self care, mobility, lifting and ambulation.      Modalities:       Charges:  Timed Code Treatment Minutes: 55   Total Treatment Minutes: 3:12-4:11  59 minutes      3:15-3:3:40 25 2 TA  3:40-3:55 15 min 1 TE  3:55-4:10 15 min 1 NMR      [] EVAL (LOW) 75983 (typically 20 minutes face-to-face)  [] EVAL (MOD) 13421 (typically 30 minutes face-to-face)  [] EVAL (HIGH) 51870 (typically 45 minutes face-to-face)  [] RE-EVAL     [x] DU(56951) x 1  [] IONTO  [x] NMR (20872) x 1     [] VASO  [] Manual (82480) x    [] Other:  [x] TA x 2   [] Mech Traction (32878)  [] ES(attended) (63061)      [] ES (un) (12927):     Goals:   Patient stated goal: Return to work; Return to working out and boxing  [] Progressing: [] Met: [] Not Met: [] Adjusted    Therapist goals for Patient:   Short Term Goals: To be achieved in: 2 weeks  1. Independent in HEP and progression per patient tolerance, in order to prevent re-injury. [] Progressing: [] Met: [] Not Met: [] Adjusted   2. Patient will have a decrease in pain to facilitate improvement in movement, function, and ADLs as indicated by Functional Deficits. [] Progressing: [] Met: [] Not Met: [] Adjusted    Long Term Goals: To be achieved in: 4-6 weeks  1. Disability index score of 25% or less for the [place] to assist with reaching prior level of function. [] Progressing: [] Met: [] Not Met: [] Adjusted  2. Patient will demonstrate increased AROM to WNL, good LS mobility, good hip ROM to allow for proper joint functioning as indicated by patients Functional Deficits. [] Progressing: [] Met: [] Not Met: [] Adjusted  3. Patient will demonstrate an increase in Strength to good proximal hip and core activation to allow for proper functional mobility as indicated by patients Functional Deficits. [] Progressing: [] Met: [] Not Met: [] Adjusted  4. Patient will return to Independent functional activities without increased symptoms or restriction. [] Progressing: [] Met: [] Not Met: [] Adjusted  5. Patient will report returning to gym with no restrictions. [] Progressing: [] Met: [] Not Met: [] Adjusted     Overall Progression Towards Functional goals/ Treatment Progress Update:  [] Patient is progressing as expected towards functional goals listed. [] Progression is slowed due to complexities/Impairments listed. [] Progression has been slowed due to co-morbidities.   [x] Plan just implemented, too soon to assess goals progression <30days   [] Goals require adjustment due to lack of progress  [] Patient is not progressing as expected and requires additional follow up with physician  [] Other    Prognosis for POC: [x] Good [] Fair  [] Poor      Patient requires continued skilled intervention: [x] Yes  [] No    Treatment/Activity Tolerance:  [x] Patient able to complete treatment  [] Patient limited by fatigue  [] Patient limited by pain     [] Patient limited by other medical complications  [x] Other:    4/19: Patient performed running activities with minimal discomfort. He still has hesitation with dynamic movements but exhibit some growth in mental relaxation and reduced fear avoidance. Continue to address mobility and fear avoidance and plan next visit to perform strengthening exercises. Progress Note Update Due. Patient education: Educated to continue to increase mobility and strengthening exercises. Encouraged to perform aerobic endurance. Prognosis: [x] Good [] Fair  [] Poor    Patient Requires Follow-up: [x] Yes  [] No    PLAN:   [x] Continue per plan of care [] Alter current plan (see comments)  [] Plan of care initiated [] Hold pending MD visit [] Discharge    Electronically signed by: Froilan Lyon PT, DPT    VERONICA Wyatt  Therapist was present, directed the patient's care, made skilled judgement, and was responsible for assessment and treatment of the patient. *If patient does not return for further follow ups after this date. Please consider this as the patients discharge from physical therapy.

## 2022-04-22 ENCOUNTER — HOSPITAL ENCOUNTER (OUTPATIENT)
Dept: PHYSICAL THERAPY | Age: 32
Setting detail: THERAPIES SERIES
Discharge: HOME | End: 2022-04-22
Payer: COMMERCIAL

## 2022-04-22 PROCEDURE — 97110 THERAPEUTIC EXERCISES: CPT

## 2022-04-22 PROCEDURE — 97140 MANUAL THERAPY 1/> REGIONS: CPT

## 2022-04-22 PROCEDURE — 97530 THERAPEUTIC ACTIVITIES: CPT

## 2022-04-22 NOTE — FLOWSHEET NOTE
[facility] and [address] [facility]. [address] [place] Final [address], [address] [facility]  Phone: [phone]   Fax:     [phone]    Physical Therapy Daily Treatment Note  Date:  2022    Patient Name:  Rubio Eugene. :  1990  MRN: [id number]  Restrictions/Precautions:    Medical/Treatment Diagnosis Information:  Diagnosis: S39.012D (ICD-10-CM) - Strain of lumbar region, subsequent encounter  Treatment Diagnosis: S39.012D (ICD-10-CM) - Strain of lumbar region, subsequent encounter  Insurance/Certification information:  PT Insurance Information: [facility]  Physician Information:  Referring Practitioner: Erica East PA-C  Has the plan of care been signed (Y/N):        []  Yes  [x]  No     Date of Patient follow up with Physician:       Is this a Progress Report:     []  Yes  [x]  No        If Yes:  Date Range for reporting period:  Beginning 3/25/22  Ending    Progress report will be due (10 Rx or 30 days whichever is less):        Recertification will be due (POC Duration  / 90 days whichever is less):          Visit # Insurance Allowable Auth Required   11   1-2xweek for 4-6 weeks [x]  Yes []  No        Functional Scale: [place]; 48% deficit   Date assessed:  3/25/22     Latex Allergy:  [x]NO      []YES  Preferred Language for Healthcare:   [x]English       []other:      Pain level:  0/10         SUBJECTIVE:   Patient feels tiredness and soreness but notices improvements in strength, stability, discomfort, and activity tolerance since the start of PT. Patient is happy with progress and learning to manage symptoms. He still feels he has room to grow with PT and wants to continue services to maximize function. He still has hesitation with movement and with work is required to lift hard objects. He would like to practice with PT first those functional tasks before returning to work.     OBJECTIVE:      ROM  3/25   Comments   Trunk flexion To mid anterior tibia + tightness present    Trunk extension 50% impaired    Trunk R sidebend To mid lateral thigh + tightness present    Trunk L sidebend WNL    Trunk R rotation WNL    Trunk L rotation WNL    HS flexibility                        Strength  3/25 Left Right Comments   Hip flexion(L2) 5 4+    Knee extension(L3) 5 5    Knee flexion(S1-2) 5 5    Ankle dorsiflexion(L4) 5 5    Toe extension(L5) 5 5    Ankle eversion/plantar flexion(S1) 5 5    Hip abd             Special tests 3/25   Comments   SLR      Slump test      Pelvic symmetry  equal     Segmental Spinal mobility      Heel walk negative     Toe walk negative     Tandem walk                 DTRs Left Right Comments   Patellar(L3-L4) = =     Achilles(S1-S2) = =                  Joint mobility: 3/25              [x]Normal               []Hypo              []Hyper     Palpation: TTP along lower R lumbar paraspinals   3/25     Functional Mobility/Transfers: Independent with increased pain with ADL's; difficulty donning and doffing socks and shoes; unable to perform full duty at work; unable to lift and carry heavy objects; unable to work out or participate in boxing; unable to ambulate for an extended period fo time; difficulty sleeping   3/25     Posture: WNL  3/25     Gait: (include devices/WB status) Antalgic gait present  3/25                           [x] Patient history, allergies, meds reviewed. Medical chart reviewed. See intake form. 3/25     Review Of Systems (ROS):   [x]Performed Review of systems (Integumentary, CardioPulmonary, Neurological) by intake and observation. Intake form has been scanned into medical record. Patient has been instructed to contact their primary care physician regarding ROS issues if not already being addressed at this time.   3/25     RESTRICTIONS/PRECAUTIONS:     Exercises/Interventions: BILATERAL  ROM/stretches/Mobility     SKTC 20 sec holds b/l x 4    DKTC 10 sec x 10    Child Pose w/ side bend  2 X 15 Prayer stretch to Cobra 2 X 10    Butterfly Stretch 3 x 30 secs    Thread the needles 2 X 15   b/l    Seated Lumbar Rotation w/ pigeon stretch 30 sec x 3  4/22   Toe Touches  3 x 15    4/22   Split Stance Spinal Rotations 2 x 15  b/l    World Greatest Stretch  2 x 15 b/l     Hamstring   5 x 30 seconds  b/l 4/22   Prone press ups (combo with STM and PA mob of lumbar) 1 x 10     Standing Calf stretch  5 x 30 sec 4/22   Cat and camel  2 X 15    Lateral walking hurdles  2 laps    Open/Close handley 2 laps    Alter G Treadmill Running Total 13 minutes (90% BW)  2 min run: 1 min walk  Range; 3.3 mph-5.5 mph Performed on 4/19    RDLs 3 x 10 (barbell)    90/90 hip mobility (seated)  Hip ER/IR Openers  3 x 10   3 x 10     Seated SLR overs to heel slide  2 x 10 each     Strengthening     Single leg bridge  3 x 10 b/l    Shoulder Press 3 x 10 b/l 25 lbs     Leg Sled  3 x 12 (240 lbs) 4/22   Functional Squat floor  and Press/Reach  2 x 2 mins (20, 25, 30 lbs dumbbells)    Functional Farmer Carries  2 x 2 min 30 sec (20, 25, 30 lbs dumbbell)    elliptical  8 mins 4/22   Bird dogs  3 x 12     Planks (forward and sides) 2 x 30 secs      Education Continued discussion on mental aspect of movement,  Talk about activity tolerance and symptoms management, discussion on overcoming fear, motivation on progress made, educated on continued HEP    4/22   Reverse  Lunges 3 X 15 each  b/l each  4/22   Leg extension  3 x 15 (50 lbs) 4/22   Leg Curls  3 x 15 (40 lbs) 4/22   SLS on Foam 3 x 30 sec b/l  4/22   Sit ups  2 x 10     Leg raise Hold (6 inches) 1 x 25 secs, 1 x 33 secs    Leg raises  10x         Manual Intervention       Manual Hams. Stretch (5 sec.  CR) 2 min X 1  b/l      Lumbar rotation In SL 3 min  4/22    Prone PA 4 min  4/22    GISTM/STM 8 min 4/22    Lumbar Manip      SI Manip      Hip belt mobs      Hip LA distraction              Therapeutic Exercise and NMR EXR  [x] (88189) Provided verbal/tactile cueing for activities related to strengthening, flexibility, endurance, ROM  for improvements in proximal hip and core control with self care, mobility, lifting and ambulation. [x] (60562) Provided verbal/tactile cueing for activities related to improving balance, coordination, kinesthetic sense, posture, motor skill, proprioception  to assist with core control in self care, mobility, lifting, and ambulation. Therapeutic Activities:    [x] (09865 or 59207) Provided verbal/tactile cueing for activities related to improving balance, coordination, kinesthetic sense, posture, motor skill, proprioception and motor activation to allow for proper function  with self care and ADLs  [x] (41379) Provided training and instruction to the patient for proper core and proximal hip recruitment and positioning with ambulation re-education     Home Exercise Program:    [x] (46969) Reviewed/Progressed HEP activities related to strengthening, flexibility, endurance, ROM of core, proximal hip and LE for functional self-care, mobility, lifting and ambulation   [] (79193) Reviewed/Progressed HEP activities related to improving balance, coordination, kinesthetic sense, posture, motor skill, proprioception of core, proximal hip and LE for self care, mobility, lifting, and ambulation      Manual Treatments:  PROM / STM / Oscillations-Mobs:  G-I, II, III, IV (PA's, Inf., Post.)  [] (38959) Provided manual therapy to mobilize proximal hip and LS spine soft tissue/joints for the purpose of modulating pain, promoting relaxation,  increasing ROM, reducing/eliminating soft tissue swelling/inflammation/restriction, improving soft tissue extensibility and allowing for proper ROM for normal function with self care, mobility, lifting and ambulation.      Modalities:       Charges:  Timed Code Treatment Minutes: 55   Total Treatment Minutes: 3:16-4:13  59 minutes      3:16-3:30 14 1 TA  3:30-3:45 15 min 1 Manual  3:45-4:11  26 min  2 TE       [] EVAL (LOW) 35499 (typically 20 minutes face-to-face)  [] EVAL (MOD) 91614 (typically 30 minutes face-to-face)  [] EVAL (HIGH) 99790 (typically 45 minutes face-to-face)  [] RE-EVAL     [x] ZA(96715) x 2  [] IONTO  [] NMR (94375) x      [] VASO  [x] Manual (05995) x  1  [] Other:  [x] TA x 1   [] Mech Traction (76067)  [] ES(attended) (44472)      [] ES (un) (64257):     Goals:   Patient stated goal: Return to work; Return to working out and boxing  [] Progressing: [] Met: [] Not Met: [] Adjusted    Therapist goals for Patient:   Short Term Goals: To be achieved in: 2 weeks  1. Independent in HEP and progression per patient tolerance, in order to prevent re-injury. [] Progressing: [] Met: [] Not Met: [] Adjusted   2. Patient will have a decrease in pain to facilitate improvement in movement, function, and ADLs as indicated by Functional Deficits. [] Progressing: [] Met: [] Not Met: [] Adjusted    Long Term Goals: To be achieved in: 4-6 weeks  1. Disability index score of 25% or less for the [place] to assist with reaching prior level of function. [] Progressing: [] Met: [] Not Met: [] Adjusted  2. Patient will demonstrate increased AROM to WNL, good LS mobility, good hip ROM to allow for proper joint functioning as indicated by patients Functional Deficits. [] Progressing: [] Met: [] Not Met: [] Adjusted  3. Patient will demonstrate an increase in Strength to good proximal hip and core activation to allow for proper functional mobility as indicated by patients Functional Deficits. [] Progressing: [] Met: [] Not Met: [] Adjusted  4. Patient will return to Independent functional activities without increased symptoms or restriction. [] Progressing: [] Met: [] Not Met: [] Adjusted  5. Patient will report returning to gym with no restrictions.     [] Progressing: [] Met: [] Not Met: [] Adjusted     Overall Progression Towards Functional goals/ Treatment Progress Update:  [x] Patient is progressing as expected towards functional goals listed. [] Progression is slowed due to complexities/Impairments listed. [] Progression has been slowed due to co-morbidities. [] Plan just implemented, too soon to assess goals progression <30days   [] Goals require adjustment due to lack of progress  [] Patient is not progressing as expected and requires additional follow up with physician  [] Other    Prognosis for POC: [x] Good [] Fair  [] Poor      Patient requires continued skilled intervention: [x] Yes  [] No    Treatment/Activity Tolerance:  [x] Patient able to complete treatment  [] Patient limited by fatigue  [] Patient limited by pain     [] Patient limited by other medical complications  [x] Other:    4/22: Patient tolerated treatment well this session. During session continued focus was emphasized on physical strengthening and mental aspect of movement. Patient is making progress both physically and mentally but still requires continued development on deficits. He has improved mobility, activity tolerance, and discomfort but needs to attempt heavy lifts w/o fear and hesitation in order to return to physical work. Progress Note Update Due. Follow-up after MD appointment. Patient education: Educated to continue to increase mobility and strengthening exercises. Encouraged to perform aerobic endurance. Prognosis: [x] Good [] Fair  [] Poor    Patient Requires Follow-up: [x] Yes  [] No    PLAN:   [x] Continue per plan of care [] Alter current plan (see comments)  [] Plan of care initiated [] Hold pending MD visit [] Discharge    Electronically signed by: Jo Merlos PT, DPT    VERONICA Arteaga  Therapist was present, directed the patient's care, made skilled judgement, and was responsible for assessment and treatment of the patient. *If patient does not return for further follow ups after this date. Please consider this as the patients discharge from physical therapy.

## 2022-04-26 ENCOUNTER — OFFICE VISIT (OUTPATIENT)
Dept: ORTHOPEDIC SURGERY | Age: 32
End: 2022-04-26
Payer: COMMERCIAL

## 2022-04-26 ENCOUNTER — HOSPITAL ENCOUNTER (OUTPATIENT)
Dept: PHYSICAL THERAPY | Age: 32
Setting detail: THERAPIES SERIES
Discharge: HOME | End: 2022-04-26
Payer: COMMERCIAL

## 2022-04-26 VITALS — HEIGHT: 69 IN | BODY MASS INDEX: 26.66 KG/M2 | WEIGHT: 180 LBS

## 2022-04-26 DIAGNOSIS — S39.012D STRAIN OF LUMBAR REGION, SUBSEQUENT ENCOUNTER: Primary | ICD-10-CM

## 2022-04-26 PROCEDURE — 97530 THERAPEUTIC ACTIVITIES: CPT

## 2022-04-26 PROCEDURE — 99214 OFFICE O/P EST MOD 30 MIN: CPT | Performed by: PHYSICIAN ASSISTANT

## 2022-04-26 PROCEDURE — 97112 NEUROMUSCULAR REEDUCATION: CPT

## 2022-04-26 PROCEDURE — 97110 THERAPEUTIC EXERCISES: CPT

## 2022-04-26 NOTE — PROGRESS NOTES
[facility] and [address] [place] [address], [facility] [address] [facility], [address] [place] [address]  Phone: [phone]   Fax:     [phone]    Physical Therapy Daily Treatment Note  Date:  2022    Patient Name:  Jacob Asher. :  1990  MRN: [id number]  Restrictions/Precautions:    Medical/Treatment Diagnosis Information:  Diagnosis: S39.012D (ICD-10-CM) - Strain of lumbar region, subsequent encounter  Treatment Diagnosis: S39.012D (ICD-10-CM) - Strain of lumbar region, subsequent encounter  Insurance/Certification information:  PT Insurance Information: [facility]  Physician Information:  Referring Practitioner: Viviana Chadwick PA-C  Has the plan of care been signed (Y/N):        []  Yes  [x]  No     Date of Patient follow up with Physician:       Is this a Progress Report:     [x]  Yes  [x]  No        If Yes:  Date Range for reporting period:  Beginning 3/25/22  Ending 22    Progress report will be due (10 Rx or 30 days whichever is less):   3/62/74      Recertification will be due (POC Duration  / 90 days whichever is less):          Visit # Insurance Allowable Auth Required   12   1-2xweek for 4-6 weeks [x]  Yes []  No        Functional Scale: [place]; 48% deficit   Date assessed:  3/25/22     Latex Allergy:  [x]NO      []YES  Preferred Language for Healthcare:   [x]English       []other:      Pain level:  0-6/10         SUBJECTIVE:  : Patient reports performing a home workout over the weekend with good tolerance but had discomfort at the end. Patient reports since the start of PT he has improved sleep quality, functional mobility, endurance, activity tolerance, and overall quality of living. Patient still needs to improve upon mobility, repeated lifting heavy objects, overhead presses, picking up objects from grab, and physical functional tasks at work.     OBJECTIVE:      ROM     Comments   Trunk flexion To floor with minimal tightness (slight knee bend)    Trunk extension Extension occurs at hips and thoracic (<10 degs in lumbar)    Trunk R sidebend To knee with mild  tightness present    Trunk L sidebend WNL    Trunk R rotation WNL    Trunk L rotation WNL    HS flexibility                        Strength  4/26 Left Right Comments   Hip flexion(L2) 5 5    Knee extension(L3) 5 5    Knee flexion(S1-2) 5 5    Ankle dorsiflexion(L4) 5 5    Toe extension(L5) 5 5    Ankle eversion/plantar flexion(S1) 5 5    Hip abd             Special tests 3/25   Comments   SLR      Slump test      Pelvic symmetry  equal     Segmental Spinal mobility      Heel walk negative     Toe walk negative     Tandem walk                 DTRs Left Right Comments   Patellar(L3-L4) = =     Achilles(S1-S2) = =                  Joint mobility: 4/26              []Normal               [x]Hypo mild hypo lumbar and thoracic PA               []Hyper     Palpation: no TTP along lower R lumbar paraspinals   4/26     Functional Mobility/Transfers: Independent with increased pain with ADL's; no  difficulty donning and doffing socks and shoes; able to perform modified duty at work; able to lift and carry light to moderate objects but challenged with heavy objects; unable to work out or participate in boxing; able to ambulate for an extended period fo time; minimal difficulty sleeping   4/26     Posture: WNL  4/26     Gait: (include devices/WB status) No Antalgic gait present 4/26                           [x] Patient history, allergies, meds reviewed. Medical chart reviewed. See intake form. 4/26     Review Of Systems (ROS):   [x]Performed Review of systems (Integumentary, CardioPulmonary, Neurological) by intake and observation. Intake form has been scanned into medical record. Patient has been instructed to contact their primary care physician regarding ROS issues if not already being addressed at this time.   4/26     RESTRICTIONS/PRECAUTIONS:     Exercises/Interventions: BILATERAL  ROM/stretches/Mobility     SKTC 20 sec holds b/l x 4    DKTC 10 sec x 10    Child Pose w/ side bend  2 X 15    Prayer stretch to Cobra 2 X 10    Butterfly Stretch 3 x 30 secs    Thread the needles 2 X 15   b/l    Seated Lumbar Rotation w/ pigeon stretch 30 sec x 3  4/22   Toe Touches  3 x 15    4/22   Fencer Stretch 3 x 45 sec 4/26   Hamstring   4 x 45 seconds  b/l 4/22   Prone press ups (combo with STM and PA mob of lumbar) 1 x 10     Standing Calf stretch  5 x 30 sec 4/26   Cat and camel  2 X 15    Lateral walking hurdles  2 laps    Open/Close handley 2 laps    Alter G Treadmill Running Total 13 minutes (90% BW)  2 min run: 1 min walk  Range; 3.3 mph-5.5 mph Performed on 4/19    RDLs 3 x 10 (barbell)    90/90 hip mobility (seated)  Hip ER/IR Openers  3 x 10   3 x 10     Seated SLR overs to heel slide  2 x 10 each     Strengthening     Single leg bridge  3 x 10 b/l    Shoulder Press 2 x 12 b/l 30 lbs  4/26   Leg Sled  3 x 12 (240 lbs) Performed on 4/22   Functional Dead lift with carries  (partnered)   135 lbs (barbell)  5  Dead lift then:  2 laps sideways, backwards and forwards carries  4/26   Functional Tolu Lion   (deadlift to start) 2 x 2 min 30 sec  25 and 30 lbs dumbbell) 4/26   elliptical  8 mins Performed on 4/22   Bird dogs  3 x 12     Planks (forward and sides) 2 x 30 secs      Education Continued discussion on mental aspect of movement,  Talk about activity tolerance and symptoms management, discussion on overcoming fear, motivation on progress made, educated on continued HEP 4/26   Reverse  Lunges 3 X 15 each  b/l each  Performed on 4/22   Leg extension  3 x 15 (50 lbs) Performed on 4/22   Leg Curls  3 x 15 (40 lbs) Performed on 4/22   SLS on Foam 3 x 45 sec b/l  4/26   Sit ups  2 x 10     Leg raise Hold (6 inches) 1 x 25 secs, 1 x 33 secs    Monster walks and lateral band walks  3 laps with blue band (combo) 4/26       Manual Intervention       Manual Hams. Stretch (5 sec.  CR) 2 min X 1  b/l      Lumbar rotation In SL 3 min  4/22    Prone PA 4 min  4/22    GISTM/STM 8 min 4/22    Lumbar Manip      SI Manip      Hip belt mobs      Hip LA distraction              Therapeutic Exercise and NMR EXR  [x] (49275) Provided verbal/tactile cueing for activities related to strengthening, flexibility, endurance, ROM  for improvements in proximal hip and core control with self care, mobility, lifting and ambulation. [x] (27119) Provided verbal/tactile cueing for activities related to improving balance, coordination, kinesthetic sense, posture, motor skill, proprioception  to assist with core control in self care, mobility, lifting, and ambulation.      Therapeutic Activities:    [x] (94552 or 39333) Provided verbal/tactile cueing for activities related to improving balance, coordination, kinesthetic sense, posture, motor skill, proprioception and motor activation to allow for proper function  with self care and ADLs  [x] (72593) Provided training and instruction to the patient for proper core and proximal hip recruitment and positioning with ambulation re-education     Home Exercise Program:    [x] (35503) Reviewed/Progressed HEP activities related to strengthening, flexibility, endurance, ROM of core, proximal hip and LE for functional self-care, mobility, lifting and ambulation   [] (56751) Reviewed/Progressed HEP activities related to improving balance, coordination, kinesthetic sense, posture, motor skill, proprioception of core, proximal hip and LE for self care, mobility, lifting, and ambulation      Manual Treatments:  PROM / STM / Oscillations-Mobs:  G-I, II, III, IV (PA's, Inf., Post.)  [] (10605) Provided manual therapy to mobilize proximal hip and LS spine soft tissue/joints for the purpose of modulating pain, promoting relaxation,  increasing ROM, reducing/eliminating soft tissue swelling/inflammation/restriction, improving soft tissue extensibility and allowing for proper ROM for normal function with self care, mobility, lifting and ambulation. Modalities:       Charges:  Timed Code Treatment Minutes: 55   Total Treatment Minutes: 10:45-11:45  60 minutes      10:45-11:15 30 2 TA  11:15-11:25 10 min 1 NMR  11:25-11:40  15 min  1 TE       [] EVAL (LOW) 13361 (typically 20 minutes face-to-face)  [] EVAL (MOD) 79218 (typically 30 minutes face-to-face)  [] EVAL (HIGH) 68758 (typically 45 minutes face-to-face)  [] RE-EVAL     [x] TAMIKA(23244) x 1  [] IONTO  [] NMR (81402) x      [] VASO  [x] Manual (81578) x  1  [] Other:  [x] TA x 2   [] Mech Traction (62502)  [] ES(attended) (37067)      [] ES (un) (06705):     Goals:   Patient stated goal: Return to work; Return to working out and boxing  [] Progressing: [] Met: [] Not Met: [] Adjusted    Therapist goals for Patient:   Short Term Goals: To be achieved in: 2 weeks  1. Independent in HEP and progression per patient tolerance, in order to prevent re-injury. [] Progressing: [x] Met: [] Not Met: [] Adjusted   2. Patient will have a decrease in pain to facilitate improvement in movement, function, and ADLs as indicated by Functional Deficits. [] Progressing: [x] Met: [] Not Met: [] Adjusted    Long Term Goals: To be achieved in: 4-6 weeks  1. Dis[facility] to assist with reaching prior level of function. [] Progressing: [] Met: [] Not Met: [] Adjusted  2. Patient will demonstrate increased AROM to WNL, good LS mobility, good hip ROM to allow for proper joint functioning as indicated by patients Functional Deficits. [x] Progressing: [] Met: [] Not Met: [] Adjusted  3. Patient will demonstrate an increase in Strength to good proximal hip and core activation to allow for proper functional mobility as indicated by patients Functional Deficits. [x] Progressing: [] Met: [] Not Met: [] Adjusted  4. Patient will return to Independent functional activities without increased symptoms or restriction.    [x] Progressing: [] Met: [] Not Met: [] Adjusted  5. Patient will report returning to gym with no restrictions. [x] Progressing: [] Met: [] Not Met: [] Adjusted     Overall Progression Towards Functional goals/ Treatment Progress Update:  [x] Patient is progressing as expected towards functional goals listed. [] Progression is slowed due to complexities/Impairments listed. [] Progression has been slowed due to co-morbidities. [] Plan just implemented, too soon to assess goals progression <30days   [] Goals require adjustment due to lack of progress  [] Patient is not progressing as expected and requires additional follow up with physician  [] Other    Prognosis for POC: [x] Good [] Fair  [] Poor      Patient requires continued skilled intervention: [x] Yes  [] No    Treatment/Activity Tolerance:  [x] Patient able to complete treatment  [] Patient limited by fatigue  [] Patient limited by pain     [] Patient limited by other medical complications  [x] Other:    4/26: Progress note completed upon visit today; Patient met with PA who extended CD9 code and PT treatment for 3 weeks (3x per week). Patient tolerated session well, we discussed improvements made and areas that still need addressed. Plan of care moving forward focused in functional lifting and activities to assimilate work related tasks, endurance activities to adequate complete >8 hour shifts w/o fatigue, and mobility/stretching intervention to improve ROM and positioning tolerance. Perform FOTO Next visit. Patient education: Educated to continue to increase mobility and strengthening exercises. Encouraged to perform aerobic endurance.     Prognosis: [x] Good [] Fair  [] Poor    Patient Requires Follow-up: [x] Yes  [] No    PLAN:   [x] Continue per plan of care [] Alter current plan (see comments)  [] Plan of care initiated [] Hold pending MD visit [] Discharge    Electronically signed by: Mitchell Morrow, PT, DPT    Alicia Petersen, SPT  Therapist was present, directed the patient's care, made skilled judgement, and was responsible for assessment and treatment of the patient. *If patient does not return for further follow ups after this date. Please consider this as the patients discharge from physical therapy.

## 2022-04-26 NOTE — LETTER
[address]  [facility] [address]  Phone: [phone]  Fax: [phone]    Bon Earing        April 26, 2022     Patient: Victor Manuel Kramer. YOB: 1990   Date of Visit: 4/26/2022       To Whom It May Concern: It is my medical opinion that [facility] No heavy lifting greater than 15lbs, and no repetative bending or twisting. This is to last for 4 weeks. If you have any questions or concerns, please don't hesitate to call.     Sincerely,        Alberto Handley PA-C

## 2022-04-26 NOTE — PROGRESS NOTES
FOLLOW UP SPINE    4/26/2022     CHIEF COMPLAINT:    Chief Complaint   Patient presents with    Follow-up     WC F/U LUMBAR        HISTORY OF PRESENT ILLNESS:              The patient is a 27 y.o. male here to follow-up after physical therapy and pharmacologic care for low back pain after a work injury 2/14/2022. He works in [employer] and on this date slipped on ice and fell injuring his low back. He now describes more intermittent aching right low back pain. Periodically he will have a \"sharp\" pain with transitioning. His symptoms are episodic but increased with sitting. He reports some temporary relief with resting on his left side and heat. Physical therapy has been very helpful. He overall states he is at least 60% improved at this current time with improved functionality. Other conservative care includes MDP, brace, topical ointment, massage ball, urgent care evaluation. He denies any distal radiating pain. He denies any progressive numbness tingling or weakness. Denies any recent bowel or bladder dysfunction or saddle anesthesia. Overall he is improving. The pain assessment was noted & reviewed in the medical record today.      Current/Past Treatment:   · Physical Therapy: Yes with benefit  · Chiropractic:     · Injection:     Medications:            NSAIDS:             Muscle relaxer:              Steriods:   MDP with benefit            Neuropathic medications:              Opioids:            Other:   · Surgery/Consult:    Work Status/Functionality: HVAC, on light duty    Past Medical History: Medical history form was reviewed today & scanned into the media tab  Past Medical History:   Diagnosis Date    Gynecomastia       Past Surgical History:     Past Surgical History:   Procedure Laterality Date    FINGER SURGERY Right 02/13/2017    closed reduction and percutaneous pinning right thumb fracture    FOOT SURGERY Right     KNEE SURGERY Left 03/2017    SHOULDER SURGERY Left     SKIN CANCER EXCISION      back; non-melanoma     Current Medications:   No current outpatient medications on file. Allergies:  Patient has no known allergies. Social History:    reports that he has never smoked. He has never used smokeless tobacco. He reports that he does not drink alcohol and does not use drugs. Family History:   Family History   Problem Relation Age of Onset    No Known Problems Mother     No Known Problems Brother     Cancer Maternal Grandfather         lung    Arthritis Paternal Grandfather        REVIEW OF SYSTEMS: Full ROS reviewed & scanned into chart  CONSTITUTIONAL: Denies unexplained weight loss, fevers   SKIN: Denies active skin conditions       PHYSICAL EXAM:    Vitals: Height 5' 9\" (1.753 m), weight 180 lb (81.6 kg). Pain score 5/10    GENERAL EXAM:  · General Apparence: Patient is adequately groomed with no evidence of malnutrition. · Orientation: The patient is oriented to time, place and person. · Mood & Affect:The patient's mood and affect are appropriate   · Lymphatic: The lymphatic examination bilaterally reveals all areas to be without enlargement or induration  · Sensation: Sensation is intact without deficit  · Coordination/Balance: Good coordination     LUMBAR/SACRAL EXAMINATION:  · Inspection: Local inspection shows no step-off or bruising. Lumbar alignment is normal.  Sagittal and Coronal balance is neutral.      · Palpation: No focal tenderness at midline today  · Range of Motion: Intact flexion, mild to moderate loss extension  · Strength:   Strength testing is 5/5 in all muscle groups tested. · Special Tests:   Straight leg raise and crossed SLR negative. Leg length and pelvis level.  0 out of 5 Cezar's signs. · Skin: There are no rashes, ulcerations or lesions. · Reflexes: Reflexes are symmetrically 2+ at the patellar and ankle tendons. Clonus absent bilaterally at the feet.   · Gait & station: Normal unassisted  · Additional Examinations:   · RIGHT LOWER EXTREMITY: Inspection/examination of the right lower extremity does not show any tenderness, deformity or injury. Range of motion is full. There is no gross instability. There are no rashes, ulcerations or lesions. Strength and tone are normal.  ·   · LEFT LOWER EXTREMITY:  Inspection/examination of the left lower extremity does not show any tenderness, deformity or injury. Range of motion is full. There is no gross instability. There are no rashes, ulcerations or lesions. Strength and tone are normal.    Diagnostic Testing:    PT notes reviewed    Lumbar MRI scan report independently reviewed from March 2022 showing minimal multilevel DDD, disc bulging L5-S1. No high-grade central stenosis. No acute fracture. Small area of modic degenerative changes L5--discussed with radiologist, Dr. Hillary Grace. Lumbar x-rays reviewed independently from urgent care February 2022 shows questionable right L5 transverse process fracture. L2 mild superior and inferior endplate irregularity possible Schmorl's node vs mild compression fx        Impression:  1) Acute right low back pain, lumbar strain   2) H/o work injury 2-14-22      Plan:   1) He is overall 60% improved at this current time. He is finding physical therapy very helpful.   Recommend continuing PT x1mo  2) Ibuprofen as needed  3) Cont light duty x3-weeks  4) F/u 3wks for re-evaluation, discussed goal of returning full duty         John Richards PA-C, [facility]  Board Certified by the [facility] W Florentino Melendez

## 2022-04-26 NOTE — LETTER
[address]  [facility] [address]  Phone: [phone]  Fax: [phone]    Luis Sanchez        April 26, 2022     Patient: Viviane Glaser. YOB: 1990   Date of Visit: 4/26/2022       To Whom It May Concern: It is my medical opinion that Fernanda Woodward may return to light duty immediately with the following restrictions: No heavy lifting greater than 15lbs, and no repetative bending or twisting. This is to last for 3 weeks. If you have any questions or concerns, please don't hesitate to call.     Sincerely,        John Richards PA-C

## 2022-04-26 NOTE — LETTER
[address]  [facility] [address]  Phone: [phone]  Fax: [phone]    Baron Urbinajanette        April 26, 2022     Patient: Andrew Trent. YOB: 1990   Date of Visit: 4/26/2022       To Whom It May Concern: It is my medical opinion that Pat Manjarrez may return to light duty immediately with the following restrictions: No heavy lifting greater than 15lbs, and no repetative bending or twisting. This is to last for 4 weeks. If you have any questions or concerns, please don't hesitate to call.     Sincerely,        Rhonda Diallo PA-C

## 2022-04-29 ENCOUNTER — HOSPITAL ENCOUNTER (OUTPATIENT)
Dept: PHYSICAL THERAPY | Age: 32
Setting detail: THERAPIES SERIES
Discharge: HOME | End: 2022-04-29
Payer: COMMERCIAL

## 2022-04-29 PROCEDURE — 97530 THERAPEUTIC ACTIVITIES: CPT

## 2022-04-29 PROCEDURE — 97110 THERAPEUTIC EXERCISES: CPT

## 2022-04-29 NOTE — FLOWSHEET NOTE
present    Trunk L sidebend WNL    Trunk R rotation WNL    Trunk L rotation WNL    HS flexibility                        Strength  4/26 Left Right Comments   Hip flexion(L2) 5 5    Knee extension(L3) 5 5    Knee flexion(S1-2) 5 5    Ankle dorsiflexion(L4) 5 5    Toe extension(L5) 5 5    Ankle eversion/plantar flexion(S1) 5 5    Hip abd             Special tests 3/25   Comments   SLR      Slump test      Pelvic symmetry  equal     Segmental Spinal mobility      Heel walk negative     Toe walk negative     Tandem walk                 DTRs Left Right Comments   Patellar(L3-L4) = =     Achilles(S1-S2) = =                  Joint mobility: 4/26              []Normal               [x]Hypo mild hypo lumbar and thoracic PA               []Hyper     Palpation: no TTP along lower R lumbar paraspinals   4/26     Functional Mobility/Transfers: Independent with increased pain with ADL's; no  difficulty donning and doffing socks and shoes; able to perform modified duty at work; able to lift and carry light to moderate objects but challenged with heavy objects; unable to work out or participate in boxing; able to ambulate for an extended period fo time; minimal difficulty sleeping   4/26     Posture: WNL  4/26     Gait: (include devices/WB status) No Antalgic gait present 4/26                           [x] Patient history, allergies, meds reviewed. Medical chart reviewed. See intake form. 4/26     Review Of Systems (ROS):   [x]Performed Review of systems (Integumentary, CardioPulmonary, Neurological) by intake and observation. Intake form has been scanned into medical record. Patient has been instructed to contact their primary care physician regarding ROS issues if not already being addressed at this time.   4/26     RESTRICTIONS/PRECAUTIONS:     Exercises/Interventions: BILATERAL  ROM/stretches/Mobility     SKTC 20 sec holds b/l x 1 4/29   DKTC 10 sec x 10    Child Pose w/ side bend  2 X 15    Prayer stretch to Cobra 2 X 10 Butterfly Stretch 3 x 30 secs    Thread the needles 2 X 15   b/l    Seated Lumbar Rotation w/ pigeon stretch 30 sec x 3  4/29   Toe Touches  1 x 15    4/29   Fencer Stretch 4 x 30 sec 4/29   Hamstring   4 x 30 seconds  b/l 4/29   Prone press ups (combo with STM and PA mob of lumbar) 1 x 10     Standing Calf stretch  5 x 30 sec Performed on 4/26   Toy solider's  2 lap 4/29   High knee pulls 2 laps 4/29   Cat and camel  2 X 15    Lateral walking hurdles  2 laps 4/29   Open/Close handley 2 laps 4/29   Alter G Treadmill Running Total 13 minutes (90% BW)  2 min run: 1 min walk  Range; 3.3 mph-5.5 mph Performed on 4/19    RDLs 3 x 10 (barbell)    90/90 hip mobility (seated)  Hip ER/IR Openers  3 x 10   3 x 10  4/29   Seated SLR overs to heel slide  2 x 10 each     Strengthening     Single leg bridge  3 x 10 b/l    Shoulder Press 2 x 12 b/l 30 lbs  Performed on 4/26   Leg Sled  3 x 12 (240 lbs) 4/29   Functional Dead lift with carries  (partnered)   135 lbs (barbell)  5  Dead lift then:  2 laps sideways, backwards and forwards carries  Performed on 4/26   Functional Andriette Fothergill   (deadlift to start) 2 x 2 min 30 sec  25 and 30 lbs dumbbell) Performed on 4/26   elliptical  8 mins Performed on 4/22   Bird dogs  3 x 12     Planks (forward and sides) 2 x 30 secs      Education Continued discussion on mental aspect of movement,  Talk about activity tolerance and symptoms management, discussion on overcoming fear, motivation on progress made, educated on continued HEP 4/29   Reverse  Lunges 3 X 15 each  b/l each  Performed on 4/22   Leg extension  3 x 15 (50 lbs) Performed on 4/22   Leg Curls  3 x 15 (40 lbs) Performed on 4/22   SLS on Foam 3 x 45 sec b/l  Performed on 4/26   Barbell RDLs and Rows (45lbs bar)  3 x 10 each 4/29   TRX Rows and TRX Squats  3 x 10 each   4/29   Leg raise Hold (6 inches) 1 x 25 secs, 1 x 33 secs    Monster walks and lateral band walks  3 laps with blue band (combo) Performed on 4/26       Manual Intervention       Manual Hams. Stretch (5 sec. CR) 2 min X 1  b/l      Lumbar rotation In SL 3 min  4/22    Prone PA 4 min  4/22    GISTM/STM 8 min 4/22    Lumbar Manip      SI Manip      Hip belt mobs      Hip LA distraction              Therapeutic Exercise and NMR EXR  [x] (08716) Provided verbal/tactile cueing for activities related to strengthening, flexibility, endurance, ROM  for improvements in proximal hip and core control with self care, mobility, lifting and ambulation. [x] (21024) Provided verbal/tactile cueing for activities related to improving balance, coordination, kinesthetic sense, posture, motor skill, proprioception  to assist with core control in self care, mobility, lifting, and ambulation.      Therapeutic Activities:    [x] (33551 or 94328) Provided verbal/tactile cueing for activities related to improving balance, coordination, kinesthetic sense, posture, motor skill, proprioception and motor activation to allow for proper function  with self care and ADLs  [x] (65757) Provided training and instruction to the patient for proper core and proximal hip recruitment and positioning with ambulation re-education     Home Exercise Program:    [x] (85806) Reviewed/Progressed HEP activities related to strengthening, flexibility, endurance, ROM of core, proximal hip and LE for functional self-care, mobility, lifting and ambulation   [] (30072) Reviewed/Progressed HEP activities related to improving balance, coordination, kinesthetic sense, posture, motor skill, proprioception of core, proximal hip and LE for self care, mobility, lifting, and ambulation      Manual Treatments:  PROM / STM / Oscillations-Mobs:  G-I, II, III, IV (PA's, Inf., Post.)  [] (28003) Provided manual therapy to mobilize proximal hip and LS spine soft tissue/joints for the purpose of modulating pain, promoting relaxation,  increasing ROM, reducing/eliminating soft tissue swelling/inflammation/restriction, improving soft tissue extensibility and allowing for proper ROM for normal function with self care, mobility, lifting and ambulation. Modalities:       Charges:  Timed Code Treatment Minutes: 45   Total Treatment Minutes: 3:14-4:04  50      3:15-3:30 1 TE 15 minutes    3:30-4:00 2 TA 30 minutes      [] EVAL (LOW) 47909 (typically 20 minutes face-to-face)  [] EVAL (MOD) 49308 (typically 30 minutes face-to-face)  [] EVAL (HIGH) 22909 (typically 45 minutes face-to-face)  [] RE-EVAL     [x] YW(96596) x 1  [] IONTO  [] NMR (06140) x      [] VASO  [] Manual (65950) x    [] Other:  [x] TA x 2   [] Mech Traction (91596)  [] ES(attended) (03506)      [] ES (un) (44869):     Goals:   Patient stated goal: Return to work; Return to working out and boxing  [] Progressing: [] Met: [] Not Met: [] Adjusted    Therapist goals for Patient:   Short Term Goals: To be achieved in: 2 weeks  1. Independent in HEP and progression per patient tolerance, in order to prevent re-injury. [] Progressing: [x] Met: [] Not Met: [] Adjusted   2. Patient will have a decrease in pain to facilitate improvement in movement, function, and ADLs as indicated by Functional Deficits. [] Progressing: [x] Met: [] Not Met: [] Adjusted    Long Term Goals: To be achieved in: 4-6 weeks  1. Disability index [facility] 25[facility] or [facility] to assist with reaching prior level of function. [] Progressing: [] Met: [] Not Met: [] Adjusted  2. Patient will demonstrate increased AROM to WNL, good LS mobility, good hip ROM to allow for proper joint functioning as indicated by patients Functional Deficits. [x] Progressing: [] Met: [] Not Met: [] Adjusted  3. Patient will demonstrate an increase in Strength to good proximal hip and core activation to allow for proper functional mobility as indicated by patients Functional Deficits. [x] Progressing: [] Met: [] Not Met: [] Adjusted  4.  Patient will return to Independent functional activities without increased symptoms or restriction. [x] Progressing: [] Met: [] Not Met: [] Adjusted  5. Patient will report returning to gym with no restrictions. [x] Progressing: [] Met: [] Not Met: [] Adjusted     Overall Progression Towards Functional goals/ Treatment Progress Update:  [x] Patient is progressing as expected towards functional goals listed. [] Progression is slowed due to complexities/Impairments listed. [] Progression has been slowed due to co-morbidities. [] Plan just implemented, too soon to assess goals progression <30days   [] Goals require adjustment due to lack of progress  [] Patient is not progressing as expected and requires additional follow up with physician  [] Other    Prognosis for POC: [x] Good [] Fair  [] Poor      Patient requires continued skilled intervention: [x] Yes  [] No    Treatment/Activity Tolerance:  [x] Patient able to complete treatment  [] Patient limited by fatigue  [] Patient limited by pain     [] Patient limited by other medical complications  [x] Other:    4/29: Patient lacks hip mobility and still presents with hesitation with challenging dynamic movements. He has tightness in the posterior chain that limits mobility. Continue to educate on chronic back pain management, challenge patient with dynamic exercises to minimize fear, and increase mobility exercise in HEP. Perform FOTO Next visit. Patient education: Educated to continue to increase mobility and strengthening exercises. Encouraged to perform aerobic endurance. Prognosis: [x] Good [] Fair  [] Poor    Patient Requires Follow-up: [x] Yes  [] No    PLAN:   [x] Continue per plan of care [] Alter current plan (see comments)  [] Plan of care initiated [] Hold pending MD visit [] Discharge    Electronically signed by: Malick Morgan, PT, DPT    VERONICA Rubio  Therapist was present, directed the patient's care, made skilled judgement, and was responsible for assessment and treatment of the patient.         *If patient does not return for further follow ups after this date. Please consider this as the patients discharge from physical therapy.

## 2022-05-02 ENCOUNTER — HOSPITAL ENCOUNTER (OUTPATIENT)
Dept: PHYSICAL THERAPY | Age: 32
Setting detail: THERAPIES SERIES
Discharge: HOME | End: 2022-05-02
Payer: COMMERCIAL

## 2022-05-02 PROCEDURE — 97110 THERAPEUTIC EXERCISES: CPT

## 2022-05-02 PROCEDURE — 97530 THERAPEUTIC ACTIVITIES: CPT

## 2022-05-02 NOTE — FLOWSHEET NOTE
The [address] [place] [address],[address], [address], [address] [place] [address]  Phone: [phone]   Fax:     [phone]    Physical Therapy Daily Treatment Note  Date:  2022    Patient Name:  Dorna Homans. :  1990  MRN: [id number]  Restrictions/Precautions:    Medical/Treatment Diagnosis Information:  Diagnosis: S39.012D (ICD-10-CM) - Strain of lumbar region, subsequent encounter  Treatment Diagnosis: S39.012D (ICD-10-CM) - Strain of lumbar region, subsequent encounter  Insurance/Certification information:  PT Insurance Information: [id number]  Physician Information:  Referring Practitioner: Marcio Figueroa PA-C  Has the plan of care been signed (Y/N):        []  Yes  [x]  No     Date of Patient follow up with Physician:       Is this a Progress Report:     [x]  Yes  [x]  No        If Yes:  Date Range for reporting period:  Beginning 3/25/22  Ending 22    Progress report will be due (10 Rx or 30 days whichever is less):         Recertification will be due (POC Duration  / 90 days whichever is less):          Visit # Insurance Allowable Auth Required   14   1-2xweek for 4-6 weeks [x]  Yes []  No        Functional Scale: [place]; 48% deficit   Date assessed:  3/25/22     Latex Allergy:  [x]NO      []YES  Preferred Language for Healthcare:   [x]English       []other:      Pain level:  0/10    5/     SUBJECTIVE:  : Patient reports able to increase HEP and performed a lot of physical activity with minimal discomfort. He still notes having some fear and worry that activity would cause pain but in the morning felt good. He reports feeling tired and tender upon the start of therapy but overall feeling better.     OBJECTIVE:      ROM     Comments   Trunk flexion To floor with minimal tightness (slight knee bend)    Trunk extension Extension occurs at hips and thoracic (<10 degs in lumbar)    Trunk R sidebend To knee with mild  tightness present    Trunk L sidebend WNL    Trunk R rotation WNL    Trunk L rotation WNL    HS flexibility                        Strength  4/26 Left Right Comments   Hip flexion(L2) 5 5    Knee extension(L3) 5 5    Knee flexion(S1-2) 5 5    Ankle dorsiflexion(L4) 5 5    Toe extension(L5) 5 5    Ankle eversion/plantar flexion(S1) 5 5    Hip abd             Special tests 3/25   Comments   SLR      Slump test      Pelvic symmetry  equal     Segmental Spinal mobility      Heel walk negative     Toe walk negative     Tandem walk                 DTRs Left Right Comments   Patellar(L3-L4) = =     Achilles(S1-S2) = =                  Joint mobility: 4/26              []Normal               [x]Hypo mild hypo lumbar and thoracic PA               []Hyper     Palpation: no TTP along lower R lumbar paraspinals   4/26     Functional Mobility/Transfers: Independent with increased pain with ADL's; no  difficulty donning and doffing socks and shoes; able to perform modified duty at work; able to lift and carry light to moderate objects but challenged with heavy objects; unable to work out or participate in boxing; able to ambulate for an extended period fo time; minimal difficulty sleeping   4/26     Posture: WNL  4/26     Gait: (include devices/WB status) No Antalgic gait present 4/26                           [x] Patient history, allergies, meds reviewed. Medical chart reviewed. See intake form. 4/26     Review Of Systems (ROS):   [x]Performed Review of systems (Integumentary, CardioPulmonary, Neurological) by intake and observation. Intake form has been scanned into medical record. Patient has been instructed to contact their primary care physician regarding ROS issues if not already being addressed at this time.   4/26     RESTRICTIONS/PRECAUTIONS:     Exercises/Interventions: BILATERAL  ROM/stretches/Mobility     SKTC 20 sec holds b/l x 1 5/2   DKTC 10 sec x 10    Child Pose w/ side bend  2 X 15    Prayer stretch to Cobra 2 X 10    Butterfly Stretch 3 x 30 secs    Thread the needles 2 X 15   b/l    Seated Lumbar Rotation w/ pigeon stretch 30 sec x 3  5/2   Toe Touches  1 x 15    5/2   Fencer Stretch 4 x 30 sec 5/2   Hamstring   4 x 30 seconds  b/l 5/2   Prone press ups (combo with STM and PA mob of lumbar) 1 x 10     Standing Calf stretch  5 x 30 sec Performed on 4/26   Toy solider's  2 lap Performed on 4/29   High knee pulls 2 laps Performed on 4/29   Cat and camel  2 X 15    Lateral walking hurdles  2 laps Performed on 4/29   Open/Close handley 2 laps Performed on 4/29   Alter G Treadmill Running Total 13 minutes (90% BW)  2 min run: 1 min walk  Range; 3.3 mph-5.5 mph Performed on 4/19    RDLs 3 x 10 (barbell)    90/90 hip mobility (seated)  Hip ER/IR Openers  3 x 10   3 x 10  4/29   Seated SLR overs to heel slide  2 x 10 each     Strengthening     DB Dead lift to Shoulder Press 3 x 8 (30 lbs) 5/2   Shoulder Press 2 x 12 b/l 30 lbs  Performed on 4/26   Leg Sled  3 x 12 (240 lbs)  Warm-up set (180 lbs 12 reps) 5/2   Functional Dead lift with carries  (partnered)   155 lbs (barbell)  5  Dead lift then:  2 laps sideways, backwards and forwards carries  5/2   Functional [employer]   (deadlift to start) 2 x 2 min 30 sec  25 and 30 lbs dumbbell) Performed on 4/26   Upright Bike 8 mins 5/2   Bird dogs  3 x 12     Planks (forward and sides) 2 x 30 secs      Education Continued discussion on mental aspect of movement,  Talk about activity tolerance and symptoms management, discussion on overcoming fear, motivation on progress made, educated on continued HEP 5/2   Reverse  Lunges 3 X 15 each  b/l each  Performed on 4/22   Leg extension  3 x 15 (50 lbs) Performed on 4/22   Leg Curls  3 x 15 (40 lbs) Performed on 4/22   SLS on Foam 3 x 45 sec b/l  Performed on 4/26   Barbell RDLs and Rows (45lbs bar)  3 x 10 each Performed on 4/29   TRX Rows and TRX Squats  3 x 10 each   Performed on 4/29   DB Rows 3 x 10 30 lbs (b/l) 5/2   [employer] walks and lateral band walks  3 laps with blue band (combo) Performed on 4/26       Manual Intervention       Manual Hams. Stretch (5 sec. CR) 2 min X 1  b/l      Lumbar rotation In SL 3 min  4/22    Prone PA 4 min  4/22    GISTM/STM 8 min 4/22    Lumbar Manip      SI Manip      Hip belt mobs      Hip LA distraction              Therapeutic Exercise and NMR EXR  [x] (30791) Provided verbal/tactile cueing for activities related to strengthening, flexibility, endurance, ROM  for improvements in proximal hip and core control with self care, mobility, lifting and ambulation. [x] (42596) Provided verbal/tactile cueing for activities related to improving balance, coordination, kinesthetic sense, posture, motor skill, proprioception  to assist with core control in self care, mobility, lifting, and ambulation.      Therapeutic Activities:    [x] (43391 or 19360) Provided verbal/tactile cueing for activities related to improving balance, coordination, kinesthetic sense, posture, motor skill, proprioception and motor activation to allow for proper function  with self care and ADLs  [x] (13565) Provided training and instruction to the patient for proper core and proximal hip recruitment and positioning with ambulation re-education     Home Exercise Program:    [x] (38914) Reviewed/Progressed HEP activities related to strengthening, flexibility, endurance, ROM of core, proximal hip and LE for functional self-care, mobility, lifting and ambulation   [] (49955) Reviewed/Progressed HEP activities related to improving balance, coordination, kinesthetic sense, posture, motor skill, proprioception of core, proximal hip and LE for self care, mobility, lifting, and ambulation      Manual Treatments:  PROM / STM / Oscillations-Mobs:  G-I, II, III, IV (PA's, Inf., Post.)  [] (78441) Provided manual therapy to mobilize proximal hip and LS spine soft tissue/joints for the purpose of modulating pain, promoting relaxation,  increasing ROM, reducing/eliminating soft tissue swelling/inflammation/restriction, improving soft tissue extensibility and allowing for proper ROM for normal function with self care, mobility, lifting and ambulation. Modalities:       Charges:  Timed Code Treatment Minutes: 45   Total Treatment Minutes: 2:40-3:30  50      2:40-3:10: 30 minutes 2 TE  3:10-3:25: 15 minute 1 TA      [] EVAL (LOW) 71727 (typically 20 minutes face-to-face)  [] EVAL (MOD) 28402 (typically 30 minutes face-to-face)  [] EVAL (HIGH) 62255 (typically 45 minutes face-to-face)  [] RE-EVAL     [x] BI(57898) x 2  [] IONTO  [] NMR (03192) x      [] VASO  [] Manual (74761) x    [] Other:  [x] TA x 1   [] Mech Traction (11799)  [] ES(attended) (85775)      [] ES (un) (69273):     Goals:   Patient stated goal: Return to work; Return to working out and boxing  [] Progressing: [] Met: [] Not Met: [] Adjusted    Therapist goals for Patient:   Short Term Goals: To be achieved in: 2 weeks  1. Independent in HEP and progression per patient tolerance, in order to prevent re-injury. [] Progressing: [x] Met: [] Not Met: [] Adjusted   2. Patient will have a decrease in pain to facilitate improvement in movement, function, and ADLs as indicated by Functional Deficits. [] Progressing: [x] Met: [] Not Met: [] Adjusted    Long Term Goals: To be achieved in: 4-6 weeks  1. Disability index score of 25% or less for the [place] to assist with reaching prior level of function. [] Progressing: [] Met: [] Not Met: [] Adjusted  2. Patient will demonstrate increased AROM to WNL, good LS mobility, good hip ROM to allow for proper joint functioning as indicated by patients Functional Deficits. [x] Progressing: [] Met: [] Not Met: [] Adjusted  3. Patient will demonstrate an increase in Strength to good proximal hip and core activation to allow for proper functional mobility as indicated by patients Functional Deficits. [x] Progressing: [] Met: [] Not Met: [] Adjusted  4. Patient will return to Independent functional activities without increased symptoms or restriction. [x] Progressing: [] Met: [] Not Met: [] Adjusted  5. Patient will report returning to gym with no restrictions. [x] Progressing: [] Met: [] Not Met: [] Adjusted     Overall Progression Towards Functional goals/ Treatment Progress Update:  [x] Patient is progressing as expected towards functional goals listed. [] Progression is slowed due to complexities/Impairments listed. [] Progression has been slowed due to co-morbidities. [] Plan just implemented, too soon to assess goals progression <30days   [] Goals require adjustment due to lack of progress  [] Patient is not progressing as expected and requires additional follow up with physician  [] Other    Prognosis for POC: [x] Good [] Fair  [] Poor      Patient requires continued skilled intervention: [x] Yes  [] No    Treatment/Activity Tolerance:  [x] Patient able to complete treatment  [] Patient limited by fatigue  [] Patient limited by pain     [] Patient limited by other medical complications  [x] Other:    5/2: Patient exhibits reducing fear of movement and shows gradual improvment in exposure to dynamic activity. We have continued to perform functional tasks required for work and he is improving each visit. He still has hip limitation, low back pain and stiffness in the morning, reduced endurance, and decreased ability to lift heavy objects but is progressing well. Continue to work on deficits listed above. Patient education: Educated to continue to increase mobility and strengthening exercises. Encouraged to perform aerobic endurance.     Prognosis: [x] Good [] Fair  [] Poor    Patient Requires Follow-up: [x] Yes  [] No    PLAN:   [x] Continue per plan of care [] Alter current plan (see comments)  [] Plan of care initiated [] Hold pending MD visit [] Discharge    Electronically signed by: Higinio Galarza, PT, DPT    Rula Mendes, SPT  Therapist was present, directed the patient's care, made skilled judgement, and was responsible for assessment and treatment of the patient. *If patient does not return for further follow ups after this date. Please consider this as the patients discharge from physical therapy.

## 2022-05-03 ENCOUNTER — HOSPITAL ENCOUNTER (OUTPATIENT)
Dept: PHYSICAL THERAPY | Age: 32
Setting detail: THERAPIES SERIES
Discharge: HOME | End: 2022-05-03
Payer: COMMERCIAL

## 2022-05-03 NOTE — FLOWSHEET NOTE
Physical Therapy  Cancellation/No-show Note  Patient Name:  Alvarado Castano. :  1990   Date:  5/3/2022  Cancelled visits to date: 02  No-shows to date: 0    For today's appointment patient:  [x]  Cancelled  []  Rescheduled appointment  []  No-show     Reason given by patient:  []  Patient ill  []  Conflicting appointment  []  No transportation    []  Conflict with work  []  No reason given  [x]  Other:     Comments: Weather related traffic, unable to make it on time.       Electronically signed by:  Rafa Burns PT, DPT stated

## 2022-05-06 ENCOUNTER — HOSPITAL ENCOUNTER (OUTPATIENT)
Dept: PHYSICAL THERAPY | Age: 32
Setting detail: THERAPIES SERIES
Discharge: HOME | End: 2022-05-06
Payer: COMMERCIAL

## 2022-05-09 ENCOUNTER — HOSPITAL ENCOUNTER (OUTPATIENT)
Dept: PHYSICAL THERAPY | Age: 32
Setting detail: THERAPIES SERIES
Discharge: HOME | End: 2022-05-09
Payer: COMMERCIAL

## 2022-05-09 NOTE — FLOWSHEET NOTE
Physical Therapy  Cancellation/No-show Note  Patient Name:  Dewey Acosta.   :  1990   Date:  2022  Cancelled visits to date: 36  No-shows to date: 0    For today's appointment patient:  [x]  Cancelled  []  Rescheduled appointment  []  No-show     Reason given by patient:  []  Patient ill  []  Conflicting appointment  []  No transportation    []  Conflict with work   []  No reason given  [x]  Other:     Comments: Denied C9 Code, Patient will contact to discuss appeal.       Electronically signed by:  Coby Porras, PT, DPT

## 2022-05-10 ENCOUNTER — APPOINTMENT (OUTPATIENT)
Dept: PHYSICAL THERAPY | Age: 32
End: 2022-05-10
Payer: COMMERCIAL

## 2022-05-12 ENCOUNTER — HOSPITAL ENCOUNTER (OUTPATIENT)
Dept: PHYSICAL THERAPY | Age: 32
Setting detail: THERAPIES SERIES
Discharge: HOME | End: 2022-05-12
Payer: COMMERCIAL

## 2022-05-12 NOTE — FLOWSHEET NOTE
Physical Therapy  Cancellation/No-show Note  Patient Name:  Alejandro Cunningham.   :  1990   Date:  2022  Cancelled visits to date: 05  No-shows to date: 0    For today's appointment patient:  [x]  Cancelled  []  Rescheduled appointment  []  No-show     Reason given by patient:  []  Patient ill  []  Conflicting appointment  []  No transportation    []  Conflict with work   []  No reason given  [x]  Other:     Comments: Denied C9 Code, Patient will contact to discuss appeal.       Electronically signed by:  Kymberly Ewing PT, DPT

## 2022-05-16 ENCOUNTER — HOSPITAL ENCOUNTER (OUTPATIENT)
Dept: PHYSICAL THERAPY | Age: 32
Setting detail: THERAPIES SERIES
End: 2022-05-16
Payer: COMMERCIAL

## 2022-05-17 ENCOUNTER — APPOINTMENT (OUTPATIENT)
Dept: PHYSICAL THERAPY | Age: 32
End: 2022-05-17
Payer: COMMERCIAL

## 2022-05-20 ENCOUNTER — APPOINTMENT (OUTPATIENT)
Dept: PHYSICAL THERAPY | Age: 32
End: 2022-05-20
Payer: COMMERCIAL

## 2022-05-31 ENCOUNTER — TELEPHONE (OUTPATIENT)
Dept: ORTHOPEDIC SURGERY | Age: 32
End: 2022-05-31

## 2022-05-31 NOTE — TELEPHONE ENCOUNTER
Called & attempted to get ahold of patient. [facility] has approved additional PT visits for the patient, he may proceed with getting the appointments scheduled. He may call back at [phone] with any questions or concerns.

## 2022-06-07 ENCOUNTER — HOSPITAL ENCOUNTER (OUTPATIENT)
Dept: PHYSICAL THERAPY | Age: 32
Setting detail: THERAPIES SERIES
Discharge: HOME | End: 2022-06-07
Payer: COMMERCIAL

## 2022-06-07 ENCOUNTER — OFFICE VISIT (OUTPATIENT)
Dept: ORTHOPEDIC SURGERY | Age: 32
End: 2022-06-07
Payer: COMMERCIAL

## 2022-06-07 VITALS — HEIGHT: 69 IN | WEIGHT: 183 LBS | BODY MASS INDEX: 27.11 KG/M2

## 2022-06-07 DIAGNOSIS — S39.012D STRAIN OF LUMBAR REGION, SUBSEQUENT ENCOUNTER: Primary | ICD-10-CM

## 2022-06-07 PROCEDURE — 97140 MANUAL THERAPY 1/> REGIONS: CPT

## 2022-06-07 PROCEDURE — 97530 THERAPEUTIC ACTIVITIES: CPT

## 2022-06-07 PROCEDURE — 99213 OFFICE O/P EST LOW 20 MIN: CPT | Performed by: PHYSICIAN ASSISTANT

## 2022-06-07 RX ORDER — MELOXICAM 15 MG/1
15 TABLET ORAL DAILY
Qty: 30 TABLET | Refills: 0 | Status: SHIPPED | OUTPATIENT
Start: 2022-06-07

## 2022-06-07 NOTE — PROGRESS NOTES
The [address] [place] [address],[address], [address], [address] [place] [address]  Phone: [phone]   Fax:     [phone]    Physical Therapy Daily Treatment Note  Date:  2022    Patient Name:  Luis Saldana. :  1990  MRN: [id number]  Restrictions/Precautions:    Medical/Treatment Diagnosis Information:  Diagnosis: S39.012D (ICD-10-CM) - Strain of lumbar region, subsequent encounter  Treatment Diagnosis: S39.012D (ICD-10-CM) - Strain of lumbar region, subsequent encounter  Insurance/Certification information:  PT Insurance Information: [facility]  Physician Information:  Referring Practitioner: Camilla Pettit PA-C  Has the plan of care been signed (Y/N):        []  Yes  [x]  No     Date of Patient follow up with Physician:       Is this a Progress Report:     [x]  Yes  [x]  No        If Yes:  Date Range for reporting period:  Beginning 22  Ending 22    Progress report will be due (10 Rx or 30 days whichever is less):         Recertification will be due (POC Duration  / 90 days whichever is less):  22        Visit # Insurance Allowable Auth Required   15  12 visits to 22 [x]  Yes []  No        Functional Scale: [place]; 48% deficit   Date assessed:  3/25/22     Latex Allergy:  [x]NO      []YES  Preferred Language for Healthcare:   [x]English       []other:      Pain level:  0-7/10         SUBJECTIVE: : Patient reports continuing to perform HEP following break from PT due to insurance authorization. Patient has continued to experience some hesitation with movements and heavy lifting. Patient still  reports having increased heavy lifting at work but has been self limited. He notes needing to work on overhead presses, picking objects up from the floor, loaded exercises to lumbar spine, and resuming previous recreational and work activities.       OBJECTIVE:   ROM     Comments   Trunk flexion To floor with minimal tightness (slight knee bend) Discomfort with return to stand   Trunk extension Extension occurs at hips and thoracic (<15 degs in lumbar)    Trunk R sidebend To knee     Trunk L sidebend WNL    Trunk R rotation WNL    Trunk L rotation WNL    HS flexibility                        Strength 6/7 Left Right Comments   Hip flexion(L2) 5 5    Knee extension(L3) 5 5    Knee flexion(S1-2) 5 5    Ankle dorsiflexion(L4) 5 5    Toe extension(L5) 5 5    Ankle eversion/plantar flexion(S1) 5 5    Hip abd             Special tests 3/25   Comments   SLR      Slump test      Pelvic symmetry  equal     Segmental Spinal mobility      Heel walk negative     Toe walk negative     Tandem walk                 DTRs Left Right Comments   Patellar(L3-L4) = =     Achilles(S1-S2) = =                  Joint mobility: 6/7              []Normal               [x]Hypo mild hypo lumbar and thoracic PA               []Hyper     Palpation: mod TTP along  R upper lumbar  And lower thoracic paraspinals   6/7     Functional Mobility/Transfers: Independent with increased pain with ADL's; no  difficulty donning and doffing socks and shoes; able to perform modified duty at work; able to lift and carry light to moderate objects but challenged with heavy objects; resumed  work out but has not participated in boxing; able to ambulate for an extended period fo time; minimal difficulty sleeping   6/7     Posture: WNL  6/7     Gait: (include devices/WB status) No Antalgic gait present 6/7                          [x] Patient history, allergies, meds reviewed. Medical chart reviewed. See intake form. 6/7     Review Of Systems (ROS):   [x]Performed Review of systems (Integumentary, CardioPulmonary, Neurological) by intake and observation. Intake form has been scanned into medical record. Patient has been instructed to contact their primary care physician regarding ROS issues if not already being addressed at this time.   4/26     RESTRICTIONS/PRECAUTIONS: Exercises/Interventions: BILATERAL  ROM/stretches/Mobility     SKTC 20 sec holds b/l x 1 6/7   DKTC 10 sec x 10    Child Pose w/ side bend  2 X 15    Prayer stretch to Cobra 2 X 10    Butterfly Stretch 3 x 30 secs    Thread the needles 2 X 15   b/l    Seated Lumbar Rotation w/ pigeon stretch 30 sec x 3  Performed on 5/2   Toe Touches  1 x 15    6/7   Fencer Stretch 4 x 30 sec Performed on 5/2   Hamstring   4 x 30 seconds  b/l Performed on 5/2   Prone press ups (combo with STM and PA mob of lumbar) 1 x 10     Standing Calf stretch  5 x 30 sec Performed on 4/26   Toy solider's  2 lap Performed on 4/29   High knee pulls 2 laps Performed on 4/29   Cat and camel  2 X 15    Lateral walking hurdles  2 laps Performed on 4/29   Open/Close handley 2 laps Performed on 4/29   Alter G Treadmill Running Total 13 minutes (90% BW)  2 min run: 1 min walk  Range; 3.3 mph-5.5 mph Performed on 4/19    RDLs 3 x 10 (barbell) 6/7   90/90 hip mobility (seated)  Hip ER/IR Openers  3 x 10   3 x 10  Performed on 4/29   SL bridges w/leg raises 5x each   6/7   Strengthening     DB Dead lift to Shoulder Press 3 x 8 (30 lbs) 6/7   Shoulder Press 2 x 12 b/l 30 lbs  Performed on 4/26   Leg Sled  3 x 12 (240 lbs) 5/2  3 x 12 (180 lbs) 6/7   Functional Dead lift with carries  (partnered)   155 lbs (barbell)  5  Dead lift then:  2 laps sideways, backwards and forwards carries  Performed on 5/2   Functional Broken Buy   (deadlift to start) 2 x 2 min 30 sec  25 and 30 lbs dumbbell) Performed on 4/26   Upright Bike 8 mins Performed on 5/2   Goblet Squat   3 x 12 (25 lbs) 6/7   Planks (forward and sides) 2 x 30 secs      Education Continued discussion on mental aspect of movement,  Talk about activity tolerance and symptoms management, discussion on overcoming fear, motivation on progress made, educated on continued HEP 6/7   Reverse  Lunges 3 X 15 each  b/l each  Performed on 4/22   Leg extension  3 x 15 (50 lbs) Performed on 4/22   Leg Curls  3 x 15 (40 lbs) Performed on 4/22   SLS on Foam 3 x 45 sec b/l  Performed on 4/26   Barbell RDLs and Rows (45lbs bar)  3 x 10 each Performed on 4/29   TRX Rows and TRX Squats  3 x 10 each   6/7   DB Rows 3 x 10 30 lbs (b/l) Performed on 5/2   Monster walks and lateral band walks  3 laps with blue band (combo) Performed on 4/26       Manual Intervention       Manual Hams. Stretch (5 sec. CR) 2 min X 1  b/l      Lumbar rotation In SL 3 min  4/22    Prone PA 4 min  6/7    GISTM/STM 8 min 6/7    Lumbar Manip      SI Manip      Hip belt mobs      Hip LA distraction              Therapeutic Exercise and NMR EXR  [x] (61686) Provided verbal/tactile cueing for activities related to strengthening, flexibility, endurance, ROM  for improvements in proximal hip and core control with self care, mobility, lifting and ambulation. [x] (73619) Provided verbal/tactile cueing for activities related to improving balance, coordination, kinesthetic sense, posture, motor skill, proprioception  to assist with core control in self care, mobility, lifting, and ambulation.      Therapeutic Activities:    [x] (44448 or 78116) Provided verbal/tactile cueing for activities related to improving balance, coordination, kinesthetic sense, posture, motor skill, proprioception and motor activation to allow for proper function  with self care and ADLs  [x] (20893) Provided training and instruction to the patient for proper core and proximal hip recruitment and positioning with ambulation re-education     Home Exercise Program:    [x] (26684) Reviewed/Progressed HEP activities related to strengthening, flexibility, endurance, ROM of core, proximal hip and LE for functional self-care, mobility, lifting and ambulation   [] (66659) Reviewed/Progressed HEP activities related to improving balance, coordination, kinesthetic sense, posture, motor skill, proprioception of core, proximal hip and LE for self care, mobility, lifting, and ambulation      Manual Treatments:  PROM / STM / Oscillations-Mobs:  G-I, II, III, IV (PA's, Inf., Post.)  [] (61834) Provided manual therapy to mobilize proximal hip and LS spine soft tissue/joints for the purpose of modulating pain, promoting relaxation,  increasing ROM, reducing/eliminating soft tissue swelling/inflammation/restriction, improving soft tissue extensibility and allowing for proper ROM for normal function with self care, mobility, lifting and ambulation. Modalities:       Charges:  Timed Code Treatment Minutes: 45   Total Treatment Minutes: 3:17-4:05  47      3:19-3:34 15 minutes 1 TA  3:35-3:50 15 minutes 1 manual  3:50-4:05 15 minute 1 TA      [] EVAL (LOW) 93616 (typically 20 minutes face-to-face)  [] EVAL (MOD) 65702 (typically 30 minutes face-to-face)  [] EVAL (HIGH) 24751 (typically 45 minutes face-to-face)  [] RE-EVAL     [] SM(47621) x   [] IONTO  [] NMR (54501) x      [] VASO  [x] Manual (09552) x  1  [] Other:  [x] TA x 2   [] Mech Traction (81070)  [] ES(attended) (50043)      [] ES (un) (43585):     Goals:   Patient stated goal: Return to work; Return to working out and boxing  [] Progressing: [] Met: [] Not Met: [] Adjusted    Therapist goals for Patient:   Short Term Goals: To be achieved in: 2 weeks  1. Independent in HEP and progression per patient tolerance, in order to prevent re-injury. [] Progressing: [x] Met: [] Not Met: [] Adjusted   2. Patient will have a decrease in pain to facilitate improvement in movement, function, and ADLs as indicated by Functional Deficits. [] Progressing: [x] Met: [] Not Met: [] Adjusted    Long Term Goals: To be achieved in: 4-6 weeks  1. Disability index score of 25% or less for the [place] to assist with reaching prior level of function. [] Progressing: [] Met: [] Not Met: [] Adjusted  2. Patient will demonstrate increased AROM to WNL, good LS mobility, good hip ROM to allow for proper joint functioning as indicated by patients Functional Deficits.    [x] Progressing: [] Met: [] Not Met: [] Adjusted  3. Patient will demonstrate an increase in Strength to good proximal hip and core activation to allow for proper functional mobility as indicated by patients Functional Deficits. [x] Progressing: [] Met: [] Not Met: [] Adjusted  4. Patient will return to Independent functional activities without increased symptoms or restriction. [x] Progressing: [] Met: [] Not Met: [] Adjusted  5. Patient will report returning to gym with no restrictions. [x] Progressing: [] Met: [] Not Met: [] Adjusted     Overall Progression Towards Functional goals/ Treatment Progress Update:  [x] Patient is progressing as expected towards functional goals listed. [] Progression is slowed due to complexities/Impairments listed. [] Progression has been slowed due to co-morbidities. [] Plan just implemented, too soon to assess goals progression <30days   [] Goals require adjustment due to lack of progress  [] Patient is not progressing as expected and requires additional follow up with physician  [] Other    Prognosis for POC: [x] Good [] Fair  [] Poor      Patient requires continued skilled intervention: [x] Yes  [] No    Treatment/Activity Tolerance:  [x] Patient able to complete treatment  [] Patient limited by fatigue  [] Patient limited by pain     [] Patient limited by other medical complications  [x] Other:    6/7: Patient returning to PT care following prolonged break due to insurance authorization; patient demos musculoskeletal restrictions, continued fear avoidance/hesitantion, and need for increased functional strength. Resume plan of care and focus on large movements, squatting, lunging, functional carries, and mobility. Patient education: Educated to continue to increase mobility and strengthening exercises. Encouraged to perform aerobic endurance.     Prognosis: [x] Good [] Fair  [] Poor    Patient Requires Follow-up: [x] Yes  [] No    PLAN:   [x] Continue per plan of care [] Alter current plan (see comments)  [] Plan of care initiated [] Hold pending MD visit [] Discharge    Electronically signed by: Sandy Peña, PT, DPT    Venus Norman SPT  Therapist was present, directed the patient's care, made skilled judgement, and was responsible for assessment and treatment of the patient. *If patient does not return for further follow ups after this date. Please consider this as the patients discharge from physical therapy.

## 2022-06-07 NOTE — LETTER
[facility] [address]  [facility] [address]  Phone: [phone]  Fax: [phone]    Jarrett Rob        June 7, 2022     Patient: Rubio Eugene. YOB: 1990   Date of Visit: 6/7/2022       To Whom It May Concern: It is my medical opinion that Kat Young may return to work on 6/8/2022 on light duty for 1 month. Patient is to return to work full duty with no restrictions on 7/8/2022. If you have any questions or concerns, please don't hesitate to call.     Sincerely,        Erica East PA-C

## 2022-06-07 NOTE — PROGRESS NOTES
FOLLOW UP SPINE    6/7/2022     CHIEF COMPLAINT:    Chief Complaint   Patient presents with    Follow-up     [facility] F/U LUMBAR       HISTORY OF PRESENT ILLNESS:              The patient is a 27 y.o. male here to follow-up after for low back pain after a work injury 2/14/2022. He works in [employer]. [employer] He describes more intermittent aching right low back pain. Periodically he will have a \"sharp\" pain with transitioning. His symptoms are episodic but increased with prolonged sitting or transition. He reports some temporary relief with resting on his left side and heat. Physical therapy has been very helpful--he was recently approved for 12 more sessions of PT. He overall states he is 70-80% improved at this current time with improved functionality. Other conservative care includes MDP, brace, topical ointment, massage ball, urgent care evaluation. He denies any distal radiating pain. He denies any progressive numbness tingling or weakness. Denies any recent bowel or bladder dysfunction or saddle anesthesia. Overall he is improving. The pain assessment was noted & reviewed in the medical record today.      Current/Past Treatment:   · Physical Therapy: Yes with benefit, recently approved for 12 more sessions  · Chiropractic:     · Injection:     Medications:            NSAIDS:             Muscle relaxer:              Steriods:   MDP with benefit            Neuropathic medications:              Opioids:            Other:   · Surgery/Consult: No    Work Status/Functionality: HVAC, on light duty    Past Medical History: Medical history form was reviewed today & scanned into the media tab  Past Medical History:   Diagnosis Date    Gynecomastia       Past Surgical History:     Past Surgical History:   Procedure Laterality Date    FINGER SURGERY Right 02/13/2017    closed reduction and percutaneous pinning right thumb fracture    FOOT SURGERY Right     KNEE SURGERY Left 03/2017    SHOULDER SURGERY Left     SKIN CANCER EXCISION      back; non-melanoma     Current Medications:   No current outpatient medications on file. Allergies:  Patient has no known allergies. Social History:    reports that he has never smoked. He has never used smokeless tobacco. He reports that he does not drink alcohol and does not use drugs. Family History:   Family History   Problem Relation Age of Onset    No Known Problems Mother     No Known Problems Brother     Cancer Maternal Grandfather         lung    Arthritis Paternal Grandfather        REVIEW OF SYSTEMS: Full ROS reviewed & scanned into chart  CONSTITUTIONAL: Denies unexplained weight loss, fevers   SKIN: Denies active skin conditions       PHYSICAL EXAM:    Vitals: Height 5' 9\" (1.753 m), weight 183 lb (83 kg). Pain score 7/10    GENERAL EXAM:  · General Apparence: Patient is adequately groomed with no evidence of malnutrition. · Orientation: The patient is oriented to time, place and person. · Mood & Affect:The patient's mood and affect are appropriate   · Lymphatic: The lymphatic examination bilaterally reveals all areas to be without enlargement or induration  · Sensation: Sensation is intact without deficit  · Coordination/Balance: Good coordination     LUMBAR/SACRAL EXAMINATION:  · Inspection: Local inspection shows no step-off or bruising. Lumbar alignment is normal.  Sagittal and Coronal balance is neutral.      · Palpation: No focal tenderness at midline today  · Range of Motion: Full flexion and extension today  · Strength:   Strength testing is 5/5 in all muscle groups tested. · Special Tests:   Straight leg raise and crossed SLR negative. Leg length and pelvis level.  0 out of 5 Cezar's signs. · Skin: There are no rashes, ulcerations or lesions. · Reflexes: Reflexes are symmetrically 2+ at the patellar and ankle tendons. Clonus absent bilaterally at the feet.   · Gait & station: Normal unassisted  · Additional Examinations:   · RIGHT LOWER EXTREMITY: Inspection/examination of the right lower extremity does not show any tenderness, deformity or injury. Range of motion is full. There is no gross instability. There are no rashes, ulcerations or lesions. Strength and tone are normal.  · LEFT LOWER EXTREMITY:  Inspection/examination of the left lower extremity does not show any tenderness, deformity or injury. Range of motion is full. There is no gross instability. There are no rashes, ulcerations or lesions. Strength and tone are normal.    Diagnostic Testing:    PT notes reviewed    Lumbar MRI scan report independently reviewed from March 2022 showing minimal multilevel DDD, disc bulging L5-S1. No high-grade central stenosis. No acute fracture. Small area of modic degenerative changes L5--discussed with radiologist, Dr. Morro James. Lumbar x-rays reviewed independently from urgent care February 2022 shows questionable right L5 transverse process fracture. L2 mild superior and inferior endplate irregularity possible Schmorl's node vs mild compression fx        Impression:  1) Right low back pain, lumbar strain--improved    2) H/o work injury 2-14-22      Plan:   1) He is overall 70-80% improved at this current time. He is finding physical therapy very helpful and was approved for more sessions.    2) Mobic 15mg I po qd PRN--with food, d/c other NSAIDs  3) Cont light duty x1mo then RTW full duty   4) F/u 1mo if no further improvement         Nikki Barrientos PA-C, MPAS  Board Certified by the [address]

## 2022-06-10 ENCOUNTER — HOSPITAL ENCOUNTER (OUTPATIENT)
Dept: PHYSICAL THERAPY | Age: 32
Setting detail: THERAPIES SERIES
Discharge: HOME | End: 2022-06-10
Payer: COMMERCIAL

## 2022-06-10 PROCEDURE — 97110 THERAPEUTIC EXERCISES: CPT | Performed by: PHYSICAL THERAPIST

## 2022-06-10 PROCEDURE — 97530 THERAPEUTIC ACTIVITIES: CPT | Performed by: PHYSICAL THERAPIST

## 2022-06-10 NOTE — PROGRESS NOTES
The [facility] and [facility], [address], [address] [facility]  Phone: [phone]   Fax:     [phone]    Physical Therapy Daily Treatment Note  Date:  6/10/2022    Patient Name:  Azra Contreras :  1990  MRN: [id number]  Restrictions/Precautions:    Medical/Treatment Diagnosis Information:  Diagnosis: S39.012D (ICD-10-CM) - Strain of lumbar region, subsequent encounter  Treatment Diagnosis: S39.012D (ICD-10-CM) - Strain of lumbar region, subsequent encounter  Insurance/Certification information:  PT Insurance Information: [id number]  Physician Information:  Referring Practitioner: Jose Cohen PA-C  Has the plan of care been signed (Y/N):        []  Yes  [x]  No     Date of Patient follow up with Physician:       Is this a Progress Report:     [x]  Yes  [x]  No        If Yes:  Date Range for reporting period:  Beginning 22  Ending 22    Progress report will be due (10 Rx or 30 days whichever is less):         Recertification will be due (POC Duration  / 90 days whichever is less):  22        Visit # Insurance Allowable Auth Required   16 6/10 12 visits to 22 [x]  Yes []  No        Functional Scale: [place]; 48% deficit   Date assessed:  3/25/22     Latex Allergy:  [x]NO      []YES  Preferred Language for Healthcare:   [x]English       []other:      Pain level:  0-7/10    6/10     SUBJECTIVE: 6/10: Patient reports having increased discomfort following HEP rotational exercise. He notes he has some hesitations following pain but was able to reduce with mobility exercise. He notes still having difficulty wrapping his mind around having back pain but no physical damage; following education, patient understands the concept of pain vs damage.       OBJECTIVE:   ROM  /   Comments   Trunk flexion To floor with minimal tightness (slight knee bend) Discomfort with return to stand   Trunk extension Extension occurs at hips and thoracic (<15 degs in lumbar)    Trunk R sidebend To knee     Trunk L sidebend WNL    Trunk R rotation WNL    Trunk L rotation WNL    HS flexibility                        Strength 6/7 Left Right Comments   Hip flexion(L2) 5 5    Knee extension(L3) 5 5    Knee flexion(S1-2) 5 5    Ankle dorsiflexion(L4) 5 5    Toe extension(L5) 5 5    Ankle eversion/plantar flexion(S1) 5 5    Hip abd             Special tests 3/25   Comments   SLR      Slump test      Pelvic symmetry  equal     Segmental Spinal mobility      Heel walk negative     Toe walk negative     Tandem walk                 DTRs Left Right Comments   Patellar(L3-L4) = =     Achilles(S1-S2) = =                  Joint mobility: 6/7              []Normal               [x]Hypo mild hypo lumbar and thoracic PA               []Hyper     Palpation: mod TTP along  R upper lumbar  And lower thoracic paraspinals   6/7     Functional Mobility/Transfers: Independent with increased pain with ADL's; no  difficulty donning and doffing socks and shoes; able to perform modified duty at work; able to lift and carry light to moderate objects but challenged with heavy objects; resumed  work out but has not participated in boxing; able to ambulate for an extended period fo time; minimal difficulty sleeping   6/7     Posture: WNL  6/7     Gait: (include devices/WB status) No Antalgic gait present 6/7                          [x] Patient history, allergies, meds reviewed. Medical chart reviewed. See intake form. 6/7     Review Of Systems (ROS):   [x]Performed Review of systems (Integumentary, CardioPulmonary, Neurological) by intake and observation. Intake form has been scanned into medical record. Patient has been instructed to contact their primary care physician regarding ROS issues if not already being addressed at this time.   4/26     RESTRICTIONS/PRECAUTIONS:     Exercises/Interventions: BILATERAL  ROM/stretches/Mobility     SKTC 20 sec holds b/l x 1 6/10 DKTC 10 sec x 10    Child Pose w/ side bend  2 X 15    Prayer stretch to Cobra 2 X 10 6/10   Butterfly Stretch 3 x 30 secs    Thread the needles 2 X 15   b/l 6/10   Seated Lumbar Rotation w/ pigeon stretch 3 x 10  6/10   Toe Touches  1 x 15    6/10   Fencer Stretch 4 x 30 sec Performed on 5/2   Quad stretch  3 x 30 sec (b/l) 6/10   Hamstring   3 x 30 seconds  b/l 6/10   Prone press ups (combo with STM and PA mob of lumbar) 1 x 10     Standing Calf stretch  5 x 30 sec Performed on 4/26   Toy solider's  2 lap Performed on 4/29   High knee pulls 2 laps Performed on 4/29   Cat and camel  2 X 15    Lateral walking hurdles  2 laps Performed on 4/29   Open/Close handley 2 laps Performed on 4/29   Alter G Treadmill Running Total 13 minutes (90% BW)  2 min run: 1 min walk  Range; 3.3 mph-5.5 mph Performed on 4/19    RDLs 3 x 10 (barbell) 6/10   90/90 hip mobility (seated)  Hip ER/IR Openers  3 x 10   3 x 10  Performed on 4/29   SL bridges w/leg raises 5x each   Performed on 6/7   Strengthening     DB Dead lift to Shoulder Press 3 x 8 (20 lbs) 6/10   Shoulder Press 2 x 12 b/l 30 lbs  Performed on 4/26   Leg Sled 4 laps (65 lbs) 6/10   Leg Press 3 x 12 (240 lbs) 5/2  3 x 12 (180 lbs) 6/10   Functional Dead lift with carries  (partnered)   155 lbs (barbell)  5  Dead lift then:  3 laps 6/10   Functional Farmer Carries   (deadlift to start) 2 x 2 min 30 sec  25 and 30 lbs dumbbell) Performed on 4/26   Upright Bike 8 mins Performed on 5/2   Goblet Squat   3 x 12 (25 lbs) Performed on 6/7   Planks (forward and sides) 2 x 30 secs      Education Continued discussion on mental aspect of movement,  Talk about activity tolerance and symptoms management, discussion on overcoming fear, motivation on progress made, educated on continued HEP 6/10   Reverse  Lunges 3 X 15 each  b/l each  Performed on 4/22   Leg extension  3 x 15 (50 lbs) Performed on 4/22   Leg Curls  3 x 15 (40 lbs) Performed on 4/22   SLS on Foam 3 x 45 sec b/l  Performed on 4/26   Barbell RDLs and Rows (45lbs bar)  3 x 10 each Performed on 4/29   TRX Rows and TRX Squats  3 x 10 each   Performed on 6/7   DB Rows 3 x 10 30 lbs (b/l) Performed on 5/2   Monster walks and lateral band walks  3 laps with silver band each 6/10       Manual Intervention       Manual Hams. Stretch (5 sec. CR)      Lumbar rotation In SL      Prone PA      GISTM/STM      Lumbar Manip      SI Manip      Hip belt mobs      Hip LA distraction              Therapeutic Exercise and NMR EXR  [x] (55157) Provided verbal/tactile cueing for activities related to strengthening, flexibility, endurance, ROM  for improvements in proximal hip and core control with self care, mobility, lifting and ambulation. [x] (17297) Provided verbal/tactile cueing for activities related to improving balance, coordination, kinesthetic sense, posture, motor skill, proprioception  to assist with core control in self care, mobility, lifting, and ambulation.      Therapeutic Activities:    [x] (56774 or 37544) Provided verbal/tactile cueing for activities related to improving balance, coordination, kinesthetic sense, posture, motor skill, proprioception and motor activation to allow for proper function  with self care and ADLs  [x] (11558) Provided training and instruction to the patient for proper core and proximal hip recruitment and positioning with ambulation re-education     Home Exercise Program:    [x] (01333) Reviewed/Progressed HEP activities related to strengthening, flexibility, endurance, ROM of core, proximal hip and LE for functional self-care, mobility, lifting and ambulation   [] (84787) Reviewed/Progressed HEP activities related to improving balance, coordination, kinesthetic sense, posture, motor skill, proprioception of core, proximal hip and LE for self care, mobility, lifting, and ambulation      Manual Treatments:  PROM / STM / Oscillations-Mobs:  G-I, II, III, IV (PA's, Inf., Post.)  [] (48247) Provided manual therapy to mobilize proximal hip and LS spine soft tissue/joints for the purpose of modulating pain, promoting relaxation,  increasing ROM, reducing/eliminating soft tissue swelling/inflammation/restriction, improving soft tissue extensibility and allowing for proper ROM for normal function with self care, mobility, lifting and ambulation. Modalities:       Charges:  Timed Code Treatment Minutes: 50   Total Treatment Minutes: 3:15-4:10  55      3:15-3:50: 35 minutes 2 TA  3:50-4:05 15 min 1 TE    [] EVAL (LOW) 64833 (typically 20 minutes face-to-face)  [] EVAL (MOD) 96553 (typically 30 minutes face-to-face)  [] EVAL (HIGH) 38923 (typically 45 minutes face-to-face)  [] RE-EVAL     [x] KV(59161) x 1  [] IONTO  [] NMR (78312) x      [] VASO  [] Manual (50206) x    [] Other:  [x] TA x 2   [] Mech Traction (11799)  [] ES(attended) (45183)      [] ES (un) (80318):     Goals:   Patient stated goal: Return to work; Return to working out and boxing  [] Progressing: [] Met: [] Not Met: [] Adjusted    Therapist goals for Patient:   Short Term Goals: To be achieved in: 2 weeks  1. Independent in HEP and progression per patient tolerance, in order to prevent re-injury. [] Progressing: [x] Met: [] Not Met: [] Adjusted   2. Patient will have a decrease in pain to facilitate improvement in movement, function, and ADLs as indicated by Functional Deficits. [] Progressing: [x] Met: [] Not Met: [] Adjusted    Long Term Goals: To be achieved in: 4-6 weeks  1. Disability index score of 25% or less for the [place] to assist with reaching prior level of function. [] Progressing: [] Met: [] Not Met: [] Adjusted  2. Patient will demonstrate increased AROM to WNL, good LS mobility, good hip ROM to allow for proper joint functioning as indicated by patients Functional Deficits. [x] Progressing: [] Met: [] Not Met: [] Adjusted  3.  Patient will demonstrate an increase in Strength to good proximal hip and core activation to allow for proper functional mobility as indicated by patients Functional Deficits. [x] Progressing: [] Met: [] Not Met: [] Adjusted  4. Patient will return to Independent functional activities without increased symptoms or restriction. [x] Progressing: [] Met: [] Not Met: [] Adjusted  5. Patient will report returning to gym with no restrictions. [x] Progressing: [] Met: [] Not Met: [] Adjusted     Overall Progression Towards Functional goals/ Treatment Progress Update:  [x] Patient is progressing as expected towards functional goals listed. [] Progression is slowed due to complexities/Impairments listed. [] Progression has been slowed due to co-morbidities. [] Plan just implemented, too soon to assess goals progression <30days   [] Goals require adjustment due to lack of progress  [] Patient is not progressing as expected and requires additional follow up with physician  [] Other    Prognosis for POC: [x] Good [] Fair  [] Poor      Patient requires continued skilled intervention: [x] Yes  [] No    Treatment/Activity Tolerance:  [x] Patient able to complete treatment  [] Patient limited by fatigue  [] Patient limited by pain     [] Patient limited by other medical complications  [x] Other:    6/10: Continued to work on functional activities and mental aspect of movement. Patient is progressing toward work required loading lifting activities. Patient exhibits improving tolerance and less hesitant. Continue to encourage functional activities and resuming daily living. Patient education: Educated to continue to increase mobility and strengthening exercises. Encouraged to perform aerobic endurance.     Prognosis: [x] Good [] Fair  [] Poor    Patient Requires Follow-up: [x] Yes  [] No    PLAN:   [x] Continue per plan of care [] Alter current plan (see comments)  [] Plan of care initiated [] Hold pending MD visit [] Discharge    Electronically signed by: Denise Hook, PT, DPT    VERONICA Riggins  Therapist was present, directed the patient's care, made skilled judgement, and was responsible for assessment and treatment of the patient. *If patient does not return for further follow ups after this date. Please consider this as the patients discharge from physical therapy.

## 2022-06-13 ENCOUNTER — HOSPITAL ENCOUNTER (OUTPATIENT)
Dept: PHYSICAL THERAPY | Age: 32
Setting detail: THERAPIES SERIES
Discharge: HOME | End: 2022-06-13
Payer: COMMERCIAL

## 2022-06-13 PROCEDURE — 97110 THERAPEUTIC EXERCISES: CPT

## 2022-06-13 PROCEDURE — 97530 THERAPEUTIC ACTIVITIES: CPT

## 2022-06-13 NOTE — FLOWSHEET NOTE
The [address], [facility] [address] [facility], [address] [place] [address]  Phone: [phone]   Fax:     [phone]    Physical Therapy Daily Treatment Note  Date:  2022    Patient Name:  Osmel Crisostomo :  1990  MRN: [id number]  Restrictions/Precautions:    Medical/Treatment Diagnosis Information:  Diagnosis: S39.012D (ICD-10-CM) - Strain of lumbar region, subsequent encounter  Treatment Diagnosis: S39.012D (ICD-10-CM) - Strain of lumbar region, subsequent encounter  Insurance/Certification information:  PT Insurance Information: [id number]  Physician Information:  Referring Practitioner: Susannah Damico PA-C  Has the plan of care been signed (Y/N):        []  Yes  [x]  No     Date of Patient follow up with Physician:       Is this a Progress Report:     []  Yes  [x]  No        If Yes:  Date Range for reporting period:  Beginning 22  Ending 22    Progress report will be due (10 Rx or 30 days whichever is less):   49      Recertification will be due (POC Duration  / 90 days whichever is less):  22        Visit # Insurance Allowable Auth Required   17  12 visits to 22 [x]  Yes []  No        Functional Scale: [place]; 48% deficit   Date assessed:  3/25/22     Latex Allergy:  [x]NO      []YES  Preferred Language for Healthcare:   [x]English       []other:      Pain level:  0/10         SUBJECTIVE:  Patient reports back feeling good; he was able to lift an AC unit at work today with no discomfort but still expresses hesitantion and worry that it may have caused harm. Patient notes feeling sore after last session but has since improved.       OBJECTIVE:   ROM  /   Comments   Trunk flexion To floor with minimal tightness (slight knee bend) Discomfort with return to stand   Trunk extension Extension occurs at hips and thoracic (<15 degs in lumbar)    Trunk R sidebend To knee     Trunk L sidebend WNL Trunk R rotation WNL    Trunk L rotation WNL    HS flexibility                        Strength 6/7 Left Right Comments   Hip flexion(L2) 5 5    Knee extension(L3) 5 5    Knee flexion(S1-2) 5 5    Ankle dorsiflexion(L4) 5 5    Toe extension(L5) 5 5    Ankle eversion/plantar flexion(S1) 5 5    Hip abd             Special tests 3/25   Comments   SLR      Slump test      Pelvic symmetry  equal     Segmental Spinal mobility      Heel walk negative     Toe walk negative     Tandem walk                 DTRs Left Right Comments   Patellar(L3-L4) = =     Achilles(S1-S2) = =                  Joint mobility: 6/7              []Normal               [x]Hypo mild hypo lumbar and thoracic PA               []Hyper     Palpation: mod TTP along  R upper lumbar  And lower thoracic paraspinals   6/7     Functional Mobility/Transfers: Independent with increased pain with ADL's; no  difficulty donning and doffing socks and shoes; able to perform modified duty at work; able to lift and carry light to moderate objects but challenged with heavy objects; resumed  work out but has not participated in boxing; able to ambulate for an extended period fo time; minimal difficulty sleeping   6/7     Posture: WNL  6/7     Gait: (include devices/WB status) No Antalgic gait present 6/7                          [x] Patient history, allergies, meds reviewed. Medical chart reviewed. See intake form. 6/7     Review Of Systems (ROS):   [x]Performed Review of systems (Integumentary, CardioPulmonary, Neurological) by intake and observation. Intake form has been scanned into medical record. Patient has been instructed to contact their primary care physician regarding ROS issues if not already being addressed at this time.   4/26     RESTRICTIONS/PRECAUTIONS:     Exercises/Interventions: BILATERAL  ROM/stretches/Mobility     SKTC 20 sec holds b/l x 1 6/13   DKTC 10 sec x 10    Child Pose w/ side bend  2 X 15    Prayer stretch to Cobra 2 X 10 6/10 Butterfly Stretch 3 x 30 secs    Thread the needles 2 X 15   b/l 6/13   Seated Lumbar Rotation w/ pigeon stretch 3 x 10  6/13   Toe Touches  1 x 15    Performed on6/10   Fencer Stretch 4 x 30 sec Performed on 5/2   Quad stretch  3 x 30 sec (b/l) 6/13   Hamstring   3 x 30 seconds  b/l 6/13   Prone press ups (combo with STM and PA mob of lumbar) 1 x 10     Standing Calf stretch  5 x 30 sec Performed on 4/26   Toy solider's  2 lap Performed on 4/29   High knee pulls 2 laps Performed on 4/29   Cat and camel  2 X 15    Lateral walking hurdles  2 laps Performed on 4/29   Open/Close handley 2 laps Performed on 4/29   Alter G Treadmill Running Total 13 minutes (90% BW)  2 min run: 1 min walk  Range; 3.3 mph-5.5 mph Performed on 4/19    RDLs 3 x 10 (barbell) 6/13   90/90 hip mobility (seated)  Hip ER/IR Openers  3 x 10   3 x 10  Performed on 4/29   SL bridges w/leg raises 5x each   Performed on 6/7   Strengthening     DB Dead lift to Shoulder Press 3 x 8 (20 lbs) Performed on 6/10   Shoulder Press 2 x 12 b/l 30 lbs  Performed on 4/26   Leg Sled 4 laps (65 lbs) 6/13   Leg Press 3 x 12 (220 lbs)   2 x 12 (120 lbs) SL 6/13   Functional Dead lift with carries  (partnered)   155 lbs (barbell)  5  Dead lift then:  3 laps 6/13   Functional Mejia Carries   (deadlift to start) 2 x 2 min 30 sec  25 and 30 lbs dumbbell) Performed on 4/26   Elliptical  6 mins 6/13   Goblet Squat   3 x 12 (25 lbs) Performed on 6/7   Planks (forward and sides) 2 x 30 secs      Education Continued discussion on mental aspect of movement,  Talk about activity tolerance and symptoms management, discussion on overcoming fear, motivation on progress made, educated on continued HEP 6/13   Reverse  Lunges 3 X 15 each  b/l each  Performed on 4/22   Leg extension  3 x 15 (50 lbs) Performed on 4/22   Leg Curls  3 x 15 (40 lbs) Performed on 4/22   SLS on Foam 3 x 45 sec b/l  Performed on 4/26   Barbell RDLs and Rows (45lbs bar)  3 x 10 each Performed on 4/29   TRX Rows and TRX Squats  3 x 10 each   Performed on 6/7   DB Rows 3 x 10 30 lbs (b/l) Performed on 5/2   Monster walks and lateral band walks  3 laps with silver band each 6/13       Manual Intervention       Manual Hams. Stretch (5 sec. CR)      Lumbar rotation In SL      Prone PA      GISTM/STM      Lumbar Manip      SI Manip      Hip belt mobs      Hip LA distraction              Therapeutic Exercise and NMR EXR  [x] (82397) Provided verbal/tactile cueing for activities related to strengthening, flexibility, endurance, ROM  for improvements in proximal hip and core control with self care, mobility, lifting and ambulation. [x] (84351) Provided verbal/tactile cueing for activities related to improving balance, coordination, kinesthetic sense, posture, motor skill, proprioception  to assist with core control in self care, mobility, lifting, and ambulation.      Therapeutic Activities:    [x] (40518 or 36226) Provided verbal/tactile cueing for activities related to improving balance, coordination, kinesthetic sense, posture, motor skill, proprioception and motor activation to allow for proper function  with self care and ADLs  [x] (72753) Provided training and instruction to the patient for proper core and proximal hip recruitment and positioning with ambulation re-education     Home Exercise Program:    [x] (03175) Reviewed/Progressed HEP activities related to strengthening, flexibility, endurance, ROM of core, proximal hip and LE for functional self-care, mobility, lifting and ambulation   [] (02393) Reviewed/Progressed HEP activities related to improving balance, coordination, kinesthetic sense, posture, motor skill, proprioception of core, proximal hip and LE for self care, mobility, lifting, and ambulation      Manual Treatments:  PROM / STM / Oscillations-Mobs:  G-I, II, III, IV (PA's, Inf., Post.)  [] (99704) Provided manual therapy to mobilize proximal hip and LS spine soft tissue/joints for the purpose of modulating pain, promoting relaxation,  increasing ROM, reducing/eliminating soft tissue swelling/inflammation/restriction, improving soft tissue extensibility and allowing for proper ROM for normal function with self care, mobility, lifting and ambulation. Modalities:       Charges:  Timed Code Treatment Minutes: 50   Total Treatment Minutes: 3:13-4:07  54      3:15-3:50: 35 minutes 2 TA  3:50-4:05 15 min 1 TE    [] EVAL (LOW) 28065 (typically 20 minutes face-to-face)  [] EVAL (MOD) 66654 (typically 30 minutes face-to-face)  [] EVAL (HIGH) 36162 (typically 45 minutes face-to-face)  [] RE-EVAL     [x] ZF(19113) x 1  [] IONTO  [] NMR (96809) x      [] VASO  [] Manual (21551) x    [] Other:  [x] TA x 2   [] Mech Traction (00037)  [] ES(attended) (84370)      [] ES (un) (54410):     Goals:   Patient stated goal: Return to work; Return to working out and boxing  [] Progressing: [] Met: [] Not Met: [] Adjusted    Therapist goals for Patient:   Short Term Goals: To be achieved in: 2 weeks  1. Independent in HEP and progression per patient tolerance, in order to prevent re-injury. [] Progressing: [x] Met: [] Not Met: [] Adjusted   2. Patient will have a decrease in pain to facilitate improvement in movement, function, and ADLs as indicated by Functional Deficits. [] Progressing: [x] Met: [] Not Met: [] Adjusted    Long Term Goals: To be achieved in: 4-6 weeks  1. Disability index score of 25% or less for the [place] to assist with reaching prior level of function. [] Progressing: [] Met: [] Not Met: [] Adjusted  2. Patient will demonstrate increased AROM to WNL, good LS mobility, good hip ROM to allow for proper joint functioning as indicated by patients Functional Deficits. [x] Progressing: [] Met: [] Not Met: [] Adjusted  3. Patient will demonstrate an increase in Strength to good proximal hip and core activation to allow for proper functional mobility as indicated by patients Functional Deficits.   [x] signed by: David Woods, PT, DPT    Suzan Albright, SPT  Therapist was present, directed the patient's care, made skilled judgement, and was responsible for assessment and treatment of the patient. *If patient does not return for further follow ups after this date. Please consider this as the patients discharge from physical therapy.

## 2022-06-14 ENCOUNTER — HOSPITAL ENCOUNTER (OUTPATIENT)
Dept: PHYSICAL THERAPY | Age: 32
Setting detail: THERAPIES SERIES
Discharge: HOME | End: 2022-06-14
Payer: COMMERCIAL

## 2022-06-14 PROCEDURE — 97110 THERAPEUTIC EXERCISES: CPT

## 2022-06-14 PROCEDURE — 97530 THERAPEUTIC ACTIVITIES: CPT

## 2022-06-14 NOTE — FLOWSHEET NOTE
sidebend WNL    Trunk R rotation WNL    Trunk L rotation WNL    HS flexibility                        Strength 6/7 Left Right Comments   Hip flexion(L2) 5 5    Knee extension(L3) 5 5    Knee flexion(S1-2) 5 5    Ankle dorsiflexion(L4) 5 5    Toe extension(L5) 5 5    Ankle eversion/plantar flexion(S1) 5 5    Hip abd             Special tests 3/25   Comments   SLR      Slump test      Pelvic symmetry  equal     Segmental Spinal mobility      Heel walk negative     Toe walk negative     Tandem walk                 DTRs Left Right Comments   Patellar(L3-L4) = =     Achilles(S1-S2) = =                  Joint mobility: 6/7              []Normal               [x]Hypo mild hypo lumbar and thoracic PA               []Hyper     Palpation: mod TTP along  R upper lumbar  And lower thoracic paraspinals   6/7     Functional Mobility/Transfers: Independent with increased pain with ADL's; no  difficulty donning and doffing socks and shoes; able to perform modified duty at work; able to lift and carry light to moderate objects but challenged with heavy objects; resumed  work out but has not participated in boxing; able to ambulate for an extended period fo time; minimal difficulty sleeping   6/7     Posture: WNL  6/7     Gait: (include devices/WB status) No Antalgic gait present 6/7                          [x] Patient history, allergies, meds reviewed. Medical chart reviewed. See intake form. 6/7     Review Of Systems (ROS):   [x]Performed Review of systems (Integumentary, CardioPulmonary, Neurological) by intake and observation. Intake form has been scanned into medical record. Patient has been instructed to contact their primary care physician regarding ROS issues if not already being addressed at this time.   4/26     RESTRICTIONS/PRECAUTIONS:     Exercises/Interventions: BILATERAL  ROM/stretches/Mobility     SKTC 20 sec holds b/l x 1 Performed 6/13   DKTC 10 sec x 10    Child Pose w/ side bend  20x each  6/14   Prayer stretch to Cobra 2 X 10    Butterfly Stretch 3 x 30 secs    Thread the needles 2 X 15   b/l 6/14   Seated Lumbar Rotation w/ pigeon stretch 3 x 30 sec hold 6/14   Toe Touches  1 x 15    6/14   Fencer Stretch 4 x 30 sec 6/14   Quad stretch  3 x 30 sec (b/l) 6/14   Hamstring   3 x 30 seconds  b/l 6/14   Prone press ups (combo with STM and PA mob of lumbar) 1 x 10     Standing Calf stretch  5 x 30 sec    Toy solider's  2 lap    High knee pulls 2 laps    Cat and camel  2 X 15    Lateral walking hurdles  2 laps    Open/Close handley 2 laps    Alter G Treadmill Running Total 13 minutes (90% BW)  2 min run: 1 min walk  Range; 3.3 mph-5.5 mph    RDLs 3 x 10 (barbell) Performed 6/13   90/90 hip mobility 3 x 10  6/14   SL bridges w/leg raises 5x each      Strengthening     DB Dead lift to Shoulder Press 3 x 8 (25 lbs) 6/14   Shoulder Press 2 x 12 b/l 30 lbs     Leg Sled 4 laps (65 lbs) Performed 6/13   Leg Press 3 x 12 (220 lbs)   2 x 12 (120 lbs) SL Performed 6/13   Functional Dead lift with carries  (partnered)   155 lbs (barbell)  5  Dead lift then:  3 laps Performed 6/13   Functional Mejia Carries   (deadlift to start) 2 x 2 min 30 sec  25 and 30 lbs dumbbell)    Elliptical  8 mins 6/14   Goblet Squat   3 x 12 (25 lbs)    Planks (forward and sides) 2 x 30 secs      Education Continued discussion on mental aspect of movement,  Talk about activity tolerance and symptoms management, discussion on overcoming fear, motivation on progress made, educated on continued HEP 6/14   Reverse  Lunges 3 X 15 each  b/l each     Leg extension  3 x 15 (50 lbs)    Leg Curls  3 x 15 (40 lbs)    SLS on Foam 3 x 45 sec b/l     Barbell RDLs and Rows (45lbs bar)  3 x 10 each    TRX Rows and TRX Squats  3 x 10 each   6/14   DB Rows 3 x 10 30 lbs (b/l)    Monster walks and lateral band walks  3 laps with silver band each 6/14       Manual Intervention       Manual Hams. Stretch (5 sec.  CR)      Lumbar rotation In SL      Prone PA      GISTM/STM      Lumbar Manip      SI Manip      Hip belt mobs      Hip LA distraction              Therapeutic Exercise and NMR EXR  [x] (06283) Provided verbal/tactile cueing for activities related to strengthening, flexibility, endurance, ROM  for improvements in proximal hip and core control with self care, mobility, lifting and ambulation. [x] (27619) Provided verbal/tactile cueing for activities related to improving balance, coordination, kinesthetic sense, posture, motor skill, proprioception  to assist with core control in self care, mobility, lifting, and ambulation. Therapeutic Activities:    [x] (35421 or 19605) Provided verbal/tactile cueing for activities related to improving balance, coordination, kinesthetic sense, posture, motor skill, proprioception and motor activation to allow for proper function  with self care and ADLs  [x] (48118) Provided training and instruction to the patient for proper core and proximal hip recruitment and positioning with ambulation re-education     Home Exercise Program:    [x] (82469) Reviewed/Progressed HEP activities related to strengthening, flexibility, endurance, ROM of core, proximal hip and LE for functional self-care, mobility, lifting and ambulation   [] (82840) Reviewed/Progressed HEP activities related to improving balance, coordination, kinesthetic sense, posture, motor skill, proprioception of core, proximal hip and LE for self care, mobility, lifting, and ambulation      Manual Treatments:  PROM / STM / Oscillations-Mobs:  G-I, II, III, IV (PA's, Inf., Post.)  [] (95799) Provided manual therapy to mobilize proximal hip and LS spine soft tissue/joints for the purpose of modulating pain, promoting relaxation,  increasing ROM, reducing/eliminating soft tissue swelling/inflammation/restriction, improving soft tissue extensibility and allowing for proper ROM for normal function with self care, mobility, lifting and ambulation.      Modalities:       Charges:  Timed Code Treatment Minutes: 50   Total Treatment Minutes: 3:12-4:16  64      3:15-3:50: 35 minutes 2 TE  3:50-4:05 15 min 1 TA    [] EVAL (LOW) 97561 (typically 20 minutes face-to-face)  [] EVAL (MOD) 82039 (typically 30 minutes face-to-face)  [] EVAL (HIGH) 87852 (typically 45 minutes face-to-face)  [] RE-EVAL     [x] LK(74723) x 2  [] IONTO  [] NMR (38264) x      [] VASO  [] Manual (66475) x    [] Other:  [x] TA x 1   [] Mech Traction (87902)  [] ES(attended) (29440)      [] ES (un) (07919):     Goals:   Patient stated goal: Return to work; Return to working out and boxing  [] Progressing: [] Met: [] Not Met: [] Adjusted    Therapist goals for Patient:   Short Term Goals: To be achieved in: 2 weeks  1. Independent in HEP and progression per patient tolerance, in order to prevent re-injury. [] Progressing: [x] Met: [] Not Met: [] Adjusted   2. Patient will have a decrease in pain to facilitate improvement in movement, function, and ADLs as indicated by Functional Deficits. [] Progressing: [x] Met: [] Not Met: [] Adjusted    Long Term Goals: To be achieved in: 4-6 weeks  1. Disability index score of 25% or less for the [place] to assist with reaching prior level of function. [] Progressing: [] Met: [] Not Met: [] Adjusted  2. Patient will demonstrate increased AROM to WNL, good LS mobility, good hip ROM to allow for proper joint functioning as indicated by patients Functional Deficits. [x] Progressing: [] Met: [] Not Met: [] Adjusted  3. Patient will demonstrate an increase in Strength to good proximal hip and core activation to allow for proper functional mobility as indicated by patients Functional Deficits. [x] Progressing: [] Met: [] Not Met: [] Adjusted  4. Patient will return to Independent functional activities without increased symptoms or restriction. [x] Progressing: [] Met: [] Not Met: [] Adjusted  5. Patient will report returning to gym with no restrictions.     [x] Progressing: [] Met: [] Not Met: [] Adjusted     Overall Progression Towards Functional goals/ Treatment Progress Update:  [x] Patient is progressing as expected towards functional goals listed. [] Progression is slowed due to complexities/Impairments listed. [] Progression has been slowed due to co-morbidities. [] Plan just implemented, too soon to assess goals progression <30days   [] Goals require adjustment due to lack of progress  [] Patient is not progressing as expected and requires additional follow up with physician  [] Other    Prognosis for POC: [x] Good [] Fair  [] Poor      Patient requires continued skilled intervention: [x] Yes  [] No    Treatment/Activity Tolerance:  [x] Patient able to complete treatment  [] Patient limited by fatigue  [] Patient limited by pain     [] Patient limited by other medical complications  [x] Other:    6/14: Patient demo some soreness in LB and LE after second consecutive day of rehab; session today focused on mobility, activity tolerances movements, and continuing to work through uncomfortable positions. Patient demo improving confidence with movements and less fear avoidance. Continue to focus on functional based dynamic activities. Patient education: Educated to continue to increase mobility and strengthening exercises. Encouraged to perform aerobic endurance. Prognosis: [x] Good [] Fair  [] Poor    Patient Requires Follow-up: [x] Yes  [] No    PLAN:   [x] Continue per plan of care [] Alter current plan (see comments)  [] Plan of care initiated [] Hold pending MD visit [] Discharge    Electronically signed by: Suraj Zepeda, PT, DPT    Alfred Vance, SPT  Therapist was present, directed the patient's care, made skilled judgement, and was responsible for assessment and treatment of the patient. *If patient does not return for further follow ups after this date. Please consider this as the patients discharge from physical therapy.

## 2022-06-20 ENCOUNTER — HOSPITAL ENCOUNTER (OUTPATIENT)
Dept: PHYSICAL THERAPY | Age: 32
Setting detail: THERAPIES SERIES
Discharge: HOME | End: 2022-06-20
Payer: COMMERCIAL

## 2022-06-20 PROCEDURE — 97530 THERAPEUTIC ACTIVITIES: CPT

## 2022-06-20 PROCEDURE — 97140 MANUAL THERAPY 1/> REGIONS: CPT

## 2022-06-20 PROCEDURE — 97110 THERAPEUTIC EXERCISES: CPT

## 2022-06-20 NOTE — FLOWSHEET NOTE
The [facility] and [facility], [facility] [address] [facility], [address] [place] [address]  Phone: [phone]   Fax:     [phone]    Physical Therapy Daily Treatment Note  Date:  2022    Patient Name:  Felisa Gardner. :  1990  MRN: [id number]  Restrictions/Precautions:    Medical/Treatment Diagnosis Information:  Diagnosis: S39.012D (ICD-10-CM) - Strain of lumbar region, subsequent encounter  Treatment Diagnosis: S39.012D (ICD-10-CM) - Strain of lumbar region, subsequent encounter  Insurance/Certification information:  PT Insurance Information: [facility]  Physician Information:  Referring Practitioner: Alexis Hodges PA-C  Has the plan of care been signed (Y/N):        []  Yes  [x]  No     Date of Patient follow up with Physician:       Is this a Progress Report:     []  Yes  [x]  No        If Yes:  Date Range for reporting period:  Beginning 22  Ending 22    Progress report will be due (10 Rx or 30 days whichever is less):   51      Recertification will be due (POC Duration  / 90 days whichever is less):  22        Visit # Insurance Allowable Auth Required   19   12 visits to 22 [x]  Yes []  No        Functional Scale: [place]; 48% deficit   Date assessed:  3/25/22     Latex Allergy:  [x]NO      []YES  Preferred Language for Healthcare:   [x]English       []other:      Pain level:  0/10        SUBJECTIVE:  Patient states that he was able to hike a [place] this weekend. He states that he was really sore but is feeling a little better today.         OBJECTIVE:   ROM  6/   Comments   Trunk flexion To floor with minimal tightness (slight knee bend) Discomfort with return to stand   Trunk extension Extension occurs at hips and thoracic (<15 degs in lumbar)    Trunk R sidebend To knee     Trunk L sidebend WNL    Trunk R rotation WNL    Trunk L rotation WNL    HS flexibility                        Strength 6/ Left Right Comments   Hip flexion(L2) 5 5    Knee extension(L3) 5 5    Knee flexion(S1-2) 5 5    Ankle dorsiflexion(L4) 5 5    Toe extension(L5) 5 5    Ankle eversion/plantar flexion(S1) 5 5    Hip abd             Special tests 3/25   Comments   SLR      Slump test      Pelvic symmetry  equal     Segmental Spinal mobility      Heel walk negative     Toe walk negative     Tandem walk                 DTRs Left Right Comments   Patellar(L3-L4) = =     Achilles(S1-S2) = =                  Joint mobility: 6/7              []Normal               [x]Hypo mild hypo lumbar and thoracic PA               []Hyper     Palpation: mod TTP along  R upper lumbar  And lower thoracic paraspinals   6/7     Functional Mobility/Transfers: Independent with increased pain with ADL's; no  difficulty donning and doffing socks and shoes; able to perform modified duty at work; able to lift and carry light to moderate objects but challenged with heavy objects; resumed  work out but has not participated in boxing; able to ambulate for an extended period fo time; minimal difficulty sleeping   6/7     Posture: WNL  6/7     Gait: (include devices/WB status) No Antalgic gait present 6/7                          [x] Patient history, allergies, meds reviewed. Medical chart reviewed. See intake form. 6/7     Review Of Systems (ROS):   [x]Performed Review of systems (Integumentary, CardioPulmonary, Neurological) by intake and observation. Intake form has been scanned into medical record. Patient has been instructed to contact their primary care physician regarding ROS issues if not already being addressed at this time.   4/26     RESTRICTIONS/PRECAUTIONS:     Exercises/Interventions: BILATERAL  ROM/stretches/Mobility     SKTC 20 sec holds b/l x 1 Performed 6/13   DKTC 10 sec x 10    Child Pose w/ side bend  20x each  6/14   Prayer stretch to Cobra 2 X 10    Butterfly Stretch 3 x 30 secs    Thread the needles 2 X 15   b/l 6/14   Seated Lumbar Rotation w/ pigeon stretch 3 x 30 sec hold 6/14   Toe Touches  1 x 15    6/14   Fencer Stretch 4 x 30 sec 6/14   Quad stretch  3 x 30 sec (b/l) 6/14   Hamstring   3 x 30 seconds  b/l 6/14   Prone press ups (combo with STM and PA mob of lumbar) 1 x 10     Standing Calf stretch  5 x 30 sec    Toy solider's  2 lap    High knee pulls 2 laps    Cat and camel  2 X 15    Lateral walking hurdles  2 laps    Open/Close handley 2 laps    Alter G Treadmill Running Total 13 minutes (90% BW)  2 min run: 1 min walk  Range; 3.3 mph-5.5 mph    RDLs 3 x 10 (barbell) Performed 6/13   90/90 hip mobility 3 x 10  6/14   SL bridges w/leg raises 5x each      Strengthening     DB Dead lift to Shoulder Press 3 x 8 (25 lbs) 6/14   Shoulder Press 2 x 12 b/l 30 lbs     Leg Sled 4 laps (65 lbs) Performed 6/13   Leg Press 3 x 12 (220 lbs)   2 x 12 (120 lbs) SL Performed 6/13   Functional Dead lift with carries  (partnered)   155 lbs (barbell)  5  Dead lift then:  3 laps Performed 6/13   Functional Mejia Carries   (deadlift to start) 2 x 2 min 30 sec  25 and 30 lbs dumbbell)    Elliptical  8 mins 6/14   Goblet Squat   3 x 12 (25 lbs)    Planks (forward and sides) 2 x 30 secs      Education Continued discussion on mental aspect of movement,  Talk about activity tolerance and symptoms management, discussion on overcoming fear, motivation on progress made, educated on continued HEP 6/14   Reverse  Lunges 3 X 15 each  b/l each     Leg extension  3 x 15 (50 lbs)    Leg Curls  3 x 15 (40 lbs)    SLS on Foam 3 x 45 sec b/l     Barbell RDLs and Rows (45lbs bar)  3 x 10 each    TRX Rows and TRX Squats  3 x 10 each   6/14   DB Rows 3 x 10 30 lbs (b/l)    Monster walks and lateral band walks  3 laps with silver band each 6/14       Manual Intervention       Manual Hams. Stretch (5 sec.  CR)      Lumbar rotation In SL      Prone PA      GISTM/STM      Lumbar Manip      SI Manip      Hip belt mobs      Hip LA distraction              Therapeutic Exercise and NMR EXR  [x] (37330) Provided verbal/tactile cueing for activities related to strengthening, flexibility, endurance, ROM  for improvements in proximal hip and core control with self care, mobility, lifting and ambulation. [x] (91863) Provided verbal/tactile cueing for activities related to improving balance, coordination, kinesthetic sense, posture, motor skill, proprioception  to assist with core control in self care, mobility, lifting, and ambulation. Therapeutic Activities:    [x] (26733 or 33986) Provided verbal/tactile cueing for activities related to improving balance, coordination, kinesthetic sense, posture, motor skill, proprioception and motor activation to allow for proper function  with self care and ADLs  [x] (29295) Provided training and instruction to the patient for proper core and proximal hip recruitment and positioning with ambulation re-education     Home Exercise Program:    [x] (14403) Reviewed/Progressed HEP activities related to strengthening, flexibility, endurance, ROM of core, proximal hip and LE for functional self-care, mobility, lifting and ambulation   [] (30891) Reviewed/Progressed HEP activities related to improving balance, coordination, kinesthetic sense, posture, motor skill, proprioception of core, proximal hip and LE for self care, mobility, lifting, and ambulation      Manual Treatments:  PROM / STM / Oscillations-Mobs:  G-I, II, III, IV (PA's, Inf., Post.)  [] (67728) Provided manual therapy to mobilize proximal hip and LS spine soft tissue/joints for the purpose of modulating pain, promoting relaxation,  increasing ROM, reducing/eliminating soft tissue swelling/inflammation/restriction, improving soft tissue extensibility and allowing for proper ROM for normal function with self care, mobility, lifting and ambulation.      Modalities:       Charges:  Timed Code Treatment Minutes: 60   Total Treatment Minutes: 3:18-4:22  64      3:18-3:48: 30 minutes 2 TE  3:50-4:05 15 min 1 MAN  4:07-4:22  15 min  1 TA    [] EVAL (LOW) 09547 (typically 20 minutes face-to-face)  [] EVAL (MOD) 53263 (typically 30 minutes face-to-face)  [] EVAL (HIGH) 55849 (typically 45 minutes face-to-face)  [] RE-EVAL     [x] MISTRY(97147) x 2  [] IONTO  [] NMR (53386) x      [] VASO  [x] Manual (38934) x 1    [] Other:  [x] TA x 1   [] Mech Traction (39798)  [] ES(attended) (93809)      [] ES (un) (80873):     Goals:   Patient stated goal: Return to work; Return to working out and boxing  [] Progressing: [] Met: [] Not Met: [] Adjusted    Therapist goals for Patient:   Short Term Goals: To be achieved in: 2 weeks  1. Independent in HEP and progression per patient tolerance, in order to prevent re-injury. [] Progressing: [x] Met: [] Not Met: [] Adjusted   2. Patient will have a decrease in pain to facilitate improvement in movement, function, and ADLs as indicated by Functional Deficits. [] Progressing: [x] Met: [] Not Met: [] Adjusted    Long Term Goals: To be achieved in: 4-6 weeks  1. Disability index score of 25% or less for the [place]. to assist with reaching prior level of function. [] Progressing: [] Met: [] Not Met: [] Adjusted  2. Patient will demonstrate increased AROM to WNL, good LS mobility, good hip ROM to allow for proper joint functioning as indicated by patients Functional Deficits. [x] Progressing: [] Met: [] Not Met: [] Adjusted  3. Patient will demonstrate an increase in Strength to good proximal hip and core activation to allow for proper functional mobility as indicated by patients Functional Deficits. [x] Progressing: [] Met: [] Not Met: [] Adjusted  4. Patient will return to Independent functional activities without increased symptoms or restriction. [x] Progressing: [] Met: [] Not Met: [] Adjusted  5. Patient will report returning to gym with no restrictions.     [x] Progressing: [] Met: [] Not Met: [] Adjusted     Overall Progression Towards Functional goals/ Treatment Progress Update:  [x] Patient is progressing as expected towards functional goals listed. [] Progression is slowed due to complexities/Impairments listed. [] Progression has been slowed due to co-morbidities. [] Plan just implemented, too soon to assess goals progression <30days   [] Goals require adjustment due to lack of progress  [] Patient is not progressing as expected and requires additional follow up with physician  [] Other    Prognosis for POC: [x] Good [] Fair  [] Poor      Patient requires continued skilled intervention: [x] Yes  [] No    Treatment/Activity Tolerance:  [x] Patient able to complete treatment  [] Patient limited by fatigue  [] Patient limited by pain     [] Patient limited by other medical complications  [x] Other:    6/20: Patient tolerated treatment well this session. He was able to perform exercises with minimal reports of pain. Fatigue present at end of session. Patient demos improving confidence with movements and less fear avoidance. Continue to focus on functional based dynamic activities. Patient education: Educated to continue to increase mobility and strengthening exercises. Encouraged to perform aerobic endurance. Prognosis: [x] Good [] Fair  [] Poor    Patient Requires Follow-up: [x] Yes  [] No    PLAN:   [x] Continue per plan of care [] Alter current plan (see comments)  [] Plan of care initiated [] Hold pending MD visit [] Discharge    Electronically signed by: Allen Cannon PT, DPT      *If patient does not return for further follow ups after this date. Please consider this as the patients discharge from physical therapy.

## 2022-06-24 ENCOUNTER — HOSPITAL ENCOUNTER (OUTPATIENT)
Dept: PHYSICAL THERAPY | Age: 32
Setting detail: THERAPIES SERIES
Discharge: HOME | End: 2022-06-24
Payer: COMMERCIAL

## 2022-06-24 PROCEDURE — 97530 THERAPEUTIC ACTIVITIES: CPT

## 2022-06-24 PROCEDURE — 97110 THERAPEUTIC EXERCISES: CPT

## 2022-06-24 NOTE — FLOWSHEET NOTE
[facility] and [address] [place] [address], [facility] [address] [facility], [address] [place] [address]  Phone: [phone]   Fax:     [phone]    Physical Therapy Daily Treatment Note  Date:  2022    Patient Name:  Alejandro Cunningham. :  1990  MRN: [id number]  Restrictions/Precautions:    Medical/Treatment Diagnosis Information:  Diagnosis: S39.012D (ICD-10-CM) - Strain of lumbar region, subsequent encounter  Treatment Diagnosis: S39.012D (ICD-10-CM) - Strain of lumbar region, subsequent encounter  Insurance/Certification information:  PT Insurance Information: [id number]  Physician Information:  Referring Practitioner: Mago Walton PA-C  Has the plan of care been signed (Y/N):        []  Yes  [x]  No     Date of Patient follow up with Physician:       Is this a Progress Report:     []  Yes  [x]  No        If Yes:  Date Range for reporting period:  Beginning 22  Ending 22    Progress report will be due (10 Rx or 30 days whichever is less):   7/23/10      Recertification will be due (POC Duration  / 90 days whichever is less):  22        Visit # Insurance Allowable Auth Required   20   12 visits to 22 [x]  Yes []  No        Functional Scale: [place]; 48% deficit   Date assessed:  3/25/22     Latex Allergy:  [x]NO      []YES  Preferred Language for Healthcare:   [x]English       []other:      Pain level:  0/10        SUBJECTIVE:  Patient states that he did the most he has done yesterday but was a little stiff and sore.  He states t       OBJECTIVE:   ROM  /   Comments   Trunk flexion To floor with minimal tightness (slight knee bend) Discomfort with return to stand   Trunk extension Extension occurs at hips and thoracic (<15 degs in lumbar)    Trunk R sidebend To knee     Trunk L sidebend WNL    Trunk R rotation WNL    Trunk L rotation WNL    HS flexibility                        Strength 6/ Left Right Comments   Hip flexion(L2) 5 5    Knee extension(L3) 5 5    Knee flexion(S1-2) 5 5    Ankle dorsiflexion(L4) 5 5    Toe extension(L5) 5 5    Ankle eversion/plantar flexion(S1) 5 5    Hip abd             Special tests 3/25   Comments   SLR      Slump test      Pelvic symmetry  equal     Segmental Spinal mobility      Heel walk negative     Toe walk negative     Tandem walk                 DTRs Left Right Comments   Patellar(L3-L4) = =     Achilles(S1-S2) = =                  Joint mobility: 6/7              []Normal               [x]Hypo mild hypo lumbar and thoracic PA               []Hyper     Palpation: mod TTP along  R upper lumbar  And lower thoracic paraspinals   6/7     Functional Mobility/Transfers: Independent with increased pain with ADL's; no  difficulty donning and doffing socks and shoes; able to perform modified duty at work; able to lift and carry light to moderate objects but challenged with heavy objects; resumed  work out but has not participated in boxing; able to ambulate for an extended period fo time; minimal difficulty sleeping   6/7     Posture: WNL  6/7     Gait: (include devices/WB status) No Antalgic gait present 6/7                          [x] Patient history, allergies, meds reviewed. Medical chart reviewed. See intake form. 6/7     Review Of Systems (ROS):   [x]Performed Review of systems (Integumentary, CardioPulmonary, Neurological) by intake and observation. Intake form has been scanned into medical record. Patient has been instructed to contact their primary care physician regarding ROS issues if not already being addressed at this time.   4/26     RESTRICTIONS/PRECAUTIONS:     Exercises/Interventions: BILATERAL  ROM/stretches/Mobility     SKTC 20 sec holds b/l x 1 Performed 6/13   DKTC 10 sec x 10    Child Pose w/ side bend  20x each  6/14   Prayer stretch to Cobra 2 X 10    Butterfly Stretch 3 x 30 secs    Thread the needles 2 X 15   b/l 6/14   Seated Lumbar Rotation w/ pigeon stretch 3 x 30 sec hold 6/14 Toe Touches  1 x 15    6/14   Fencer Stretch 4 x 30 sec 6/14   Quad stretch  3 x 30 sec (b/l) 6/14   Hamstring   3 x 30 seconds  b/l 6/14   Prone press ups (combo with STM and PA mob of lumbar) 1 x 10     Standing Calf stretch  5 x 30 sec    Toy solider's  2 lap    High knee pulls 2 laps    Cat and camel  2 X 15    Lateral walking hurdles  2 laps    Open/Close handley 2 laps    Alter G Treadmill Running Total 13 minutes (90% BW)  2 min run: 1 min walk  Range; 3.3 mph-5.5 mph    RDLs 3 x 10 (barbell) Performed 6/13   90/90 hip mobility 3 x 10  6/14   SL bridges w/leg raises 5x each      Strengthening     DB Dead lift to Shoulder Press 3 x 8 (25 lbs) 6/14   Shoulder Press 2 x 12 b/l 30 lbs     Leg Sled 4 laps (65 lbs) Performed 6/13   Leg Press 3 x 12 (220 lbs)   2 x 12 (120 lbs) SL Performed 6/13   Functional Dead lift with carries  (partnered)   155 lbs (barbell)  5  Dead lift then:  3 laps Performed 6/13   Functional Mejia Carries   (deadlift to start) 2 x 2 min 30 sec  25 and 30 lbs dumbbell)    Elliptical  8 mins 6/14   Goblet Squat   3 x 12 (25 lbs)    Planks (forward and sides) 2 x 30 secs      Education Continued discussion on mental aspect of movement,  Talk about activity tolerance and symptoms management, discussion on overcoming fear, motivation on progress made, educated on continued HEP 6/14   Reverse  Lunges 3 X 15 each  b/l each     Leg extension  3 x 15 (50 lbs)    Leg Curls  3 x 15 (40 lbs)    SLS on Foam 3 x 45 sec b/l     Barbell RDLs and Rows (45lbs bar)  3 x 10 each    TRX Rows and TRX Squats  3 x 10 each   6/14   DB Rows 3 x 10 30 lbs (b/l)    Monster walks and lateral band walks  3 laps with silver band each 6/14       Manual Intervention       Manual Hams. Stretch (5 sec.  CR)      Lumbar rotation In SL      Prone PA      GISTM/STM      Lumbar Manip      SI Manip      Hip belt mobs      Hip LA distraction              Therapeutic Exercise and NMR EXR  [x] (90236) Provided verbal/tactile cueing for activities related to strengthening, flexibility, endurance, ROM  for improvements in proximal hip and core control with self care, mobility, lifting and ambulation. [x] (57671) Provided verbal/tactile cueing for activities related to improving balance, coordination, kinesthetic sense, posture, motor skill, proprioception  to assist with core control in self care, mobility, lifting, and ambulation. Therapeutic Activities:    [x] (50624 or 37437) Provided verbal/tactile cueing for activities related to improving balance, coordination, kinesthetic sense, posture, motor skill, proprioception and motor activation to allow for proper function  with self care and ADLs  [x] (51009) Provided training and instruction to the patient for proper core and proximal hip recruitment and positioning with ambulation re-education     Home Exercise Program:    [x] (15578) Reviewed/Progressed HEP activities related to strengthening, flexibility, endurance, ROM of core, proximal hip and LE for functional self-care, mobility, lifting and ambulation   [] (43588) Reviewed/Progressed HEP activities related to improving balance, coordination, kinesthetic sense, posture, motor skill, proprioception of core, proximal hip and LE for self care, mobility, lifting, and ambulation      Manual Treatments:  PROM / STM / Oscillations-Mobs:  G-I, II, III, IV (PA's, Inf., Post.)  [] (51210) Provided manual therapy to mobilize proximal hip and LS spine soft tissue/joints for the purpose of modulating pain, promoting relaxation,  increasing ROM, reducing/eliminating soft tissue swelling/inflammation/restriction, improving soft tissue extensibility and allowing for proper ROM for normal function with self care, mobility, lifting and ambulation.      Modalities:       Charges:  Timed Code Treatment Minutes: 45   Total Treatment Minutes: 3:10-4:00  50      3:10-3:40: 30 minutes 2 TE    3:45-4:00  15 min  1 TA    [] EVAL (LOW) 92387 (typically 20 minutes face-to-face)  [] EVAL (MOD) 60488 (typically 30 minutes face-to-face)  [] EVAL (HIGH) 49607 (typically 45 minutes face-to-face)  [] RE-EVAL     [x] UU(11054) x 2  [] IONTO  [] NMR (47824) x      [] VASO  [] Manual (32779) x     [] Other:  [x] TA x 1   [] Mech Traction (95850)  [] ES(attended) (62258)      [] ES (un) (66526):     Goals:   Patient stated goal: Return to work; Return to working out and boxing  [] Progressing: [] Met: [] Not Met: [] Adjusted    Therapist goals for Patient:   Short Term Goals: To be achieved in: 2 weeks  1. Independent in HEP and progression per patient tolerance, in order to prevent re-injury. [] Progressing: [x] Met: [] Not Met: [] Adjusted   2. Patient will have a decrease in pain to facilitate improvement in movement, function, and ADLs as indicated by Functional Deficits. [] Progressing: [x] Met: [] Not Met: [] Adjusted    Long Term Goals: To be achieved in: 4-6 weeks  1. Disability index score of 25% or less for the [place] to assist with reaching prior level of function. [] Progressing: [] Met: [] Not Met: [] Adjusted  2. Patient will demonstrate increased AROM to WNL, good LS mobility, good hip ROM to allow for proper joint functioning as indicated by patients Functional Deficits. [x] Progressing: [] Met: [] Not Met: [] Adjusted  3. Patient will demonstrate an increase in Strength to good proximal hip and core activation to allow for proper functional mobility as indicated by patients Functional Deficits. [x] Progressing: [] Met: [] Not Met: [] Adjusted  4. Patient will return to Independent functional activities without increased symptoms or restriction. [x] Progressing: [] Met: [] Not Met: [] Adjusted  5. Patient will report returning to gym with no restrictions.     [x] Progressing: [] Met: [] Not Met: [] Adjusted     Overall Progression Towards Functional goals/ Treatment Progress Update:  [x] Patient is progressing as expected towards functional goals

## 2022-06-27 ENCOUNTER — HOSPITAL ENCOUNTER (OUTPATIENT)
Dept: PHYSICAL THERAPY | Age: 32
Setting detail: THERAPIES SERIES
Discharge: HOME | End: 2022-06-27
Payer: COMMERCIAL

## 2022-06-27 NOTE — FLOWSHEET NOTE
Physical Therapy  Cancellation/No-show Note  Patient Name:  Jossie Rosales.   :  1990   Date:  2022  Cancelled visits to date: 06  No-shows to date: 0    For today's appointment patient:  [x]  Cancelled  []  Rescheduled appointment  []  No-show     Reason given by patient:  []  Patient ill  []  Conflicting appointment  []  No transportation    []  Conflict with work   [x]  No reason given  []  Other:     Comments:     Electronically signed by:  Suraj Zepeda, PT, DPT

## 2022-06-28 ENCOUNTER — HOSPITAL ENCOUNTER (OUTPATIENT)
Dept: PHYSICAL THERAPY | Age: 32
Setting detail: THERAPIES SERIES
Discharge: HOME | End: 2022-06-28
Payer: COMMERCIAL

## 2022-06-28 PROCEDURE — 97530 THERAPEUTIC ACTIVITIES: CPT

## 2022-06-28 PROCEDURE — 97110 THERAPEUTIC EXERCISES: CPT

## 2022-06-28 NOTE — FLOWSHEET NOTE
[facility] and [address] [place] [address], [address] [place] [address] [address] [facility], [address] [place] [address]  Phone: [phone]   Fax:     [phone]    Physical Therapy Daily Treatment Note  Date:  2022    Patient Name:  Donny Mccain. :  1990  MRN: [id number]  Restrictions/Precautions:    Medical/Treatment Diagnosis Information:  Diagnosis: S39.012D (ICD-10-CM) - Strain of lumbar region, subsequent encounter  Treatment Diagnosis: S39.012D (ICD-10-CM) - Strain of lumbar region, subsequent encounter  Insurance/Certification information:  PT Insurance Information: [facility]  Physician Information:  Referring Practitioner: Stacia Hernandez PA-C  Has the plan of care been signed (Y/N):        []  Yes  [x]  No     Date of Patient follow up with Physician:       Is this a Progress Report:     []  Yes  [x]  No        If Yes:  Date Range for reporting period:  Beginning 22  Ending 22    Progress report will be due (10 Rx or 30 days whichever is less):         Recertification will be due (POC Duration  / 90 days whichever is less):  22        Visit # Insurance Allowable Auth Required   20   12 visits to 22 [x]  Yes []  No        Functional Scale: [place]; 48% deficit   Date assessed:  3/25/22     Latex Allergy:  [x]NO      []YES  Preferred Language for Healthcare:   [x]English       []other:      Pain level:  0/10        SUBJECTIVE:  Patient states that he was able to play this weekend with no issues and was very pleased with how his back did.         OBJECTIVE:   ROM     Comments   Trunk flexion To floor with minimal tightness (slight knee bend) Discomfort with return to stand   Trunk extension Extension occurs at hips and thoracic (<15 degs in lumbar)    Trunk R sidebend To knee     Trunk L sidebend WNL    Trunk R rotation WNL    Trunk L rotation WNL    HS flexibility                        Strength  Left Right Comments   Hip flexion(L2) 5 5    Knee extension(L3) 5 5    Knee flexion(S1-2) 5 5    Ankle dorsiflexion(L4) 5 5    Toe extension(L5) 5 5    Ankle eversion/plantar flexion(S1) 5 5    Hip abd             Special tests 3/25   Comments   SLR      Slump test      Pelvic symmetry  equal     Segmental Spinal mobility      Heel walk negative     Toe walk negative     Tandem walk                 DTRs Left Right Comments   Patellar(L3-L4) = =     Achilles(S1-S2) = =                  Joint mobility: 6/7              []Normal               [x]Hypo mild hypo lumbar and thoracic PA               []Hyper     Palpation: mod TTP along  R upper lumbar  And lower thoracic paraspinals   6/7     Functional Mobility/Transfers: Independent with increased pain with ADL's; no  difficulty donning and doffing socks and shoes; able to perform modified duty at work; able to lift and carry light to moderate objects but challenged with heavy objects; resumed  work out but has not participated in boxing; able to ambulate for an extended period fo time; minimal difficulty sleeping   6/7     Posture: WNL  6/7     Gait: (include devices/WB status) No Antalgic gait present 6/7                          [x] Patient history, allergies, meds reviewed. Medical chart reviewed. See intake form. 6/7     Review Of Systems (ROS):   [x]Performed Review of systems (Integumentary, CardioPulmonary, Neurological) by intake and observation. Intake form has been scanned into medical record. Patient has been instructed to contact their primary care physician regarding ROS issues if not already being addressed at this time.   4/26     RESTRICTIONS/PRECAUTIONS:     Exercises/Interventions: BILATERAL  ROM/stretches/Mobility     SKTC 20 sec holds b/l x 1 Performed 6/13   DKTC 10 sec x 10    Child Pose w/ side bend  20x each  6/14   Prayer stretch to Cobra 2 X 10    Butterfly Stretch 3 x 30 secs    Thread the needles 2 X 15   b/l 6/14   Seated Lumbar Rotation w/ pigeon stretch 3 x 30 [id number] (typically 20 minutes face-to-face)  [] EVAL (MOD) 49495 (typically 30 minutes face-to-face)  [] EVAL (HIGH) 10808 (typically 45 minutes face-to-face)  [] RE-EVAL     [x] EA(72684) x 2  [] IONTO  [] NMR (23268) x      [] VASO  [] Manual (77899) x     [] Other:  [x] TA x 1   [] Mech Traction (72697)  [] ES(attended) (09257)      [] ES (un) (94239):     Goals:   Patient stated goal: Return to work; Return to working out and boxing  [] Progressing: [] Met: [] Not Met: [] Adjusted    Therapist goals for Patient:   Short Term Goals: To be achieved in: 2 weeks  1. Independent in HEP and progression per patient tolerance, in order to prevent re-injury. [] Progressing: [x] Met: [] Not Met: [] Adjusted   2. Patient will have a decrease in pain to facilitate improvement in movement, function, and ADLs as indicated by Functional Deficits. [] Progressing: [x] Met: [] Not Met: [] Adjusted    Long Term Goals: To be achieved in: 4-6 weeks  1. Disability index score of 25% or less for the [place] to assist with reaching prior level of function. [] Progressing: [] Met: [] Not Met: [] Adjusted  2. Patient will demonstrate increased AROM to WNL, good LS mobility, good hip ROM to allow for proper joint functioning as indicated by patients Functional Deficits. [x] Progressing: [] Met: [] Not Met: [] Adjusted  3. Patient will demonstrate an increase in Strength to good proximal hip and core activation to allow for proper functional mobility as indicated by patients Functional Deficits. [x] Progressing: [] Met: [] Not Met: [] Adjusted  4. Patient will return to Independent functional activities without increased symptoms or restriction. [x] Progressing: [] Met: [] Not Met: [] Adjusted  5. Patient will report returning to gym with no restrictions.     [x] Progressing: [] Met: [] Not Met: [] Adjusted     Overall Progression Towards Functional goals/ Treatment Progress Update:  [x] Patient is progressing as expected towards functional goals listed. [] Progression is slowed due to complexities/Impairments listed. [] Progression has been slowed due to co-morbidities. [] Plan just implemented, too soon to assess goals progression <30days   [] Goals require adjustment due to lack of progress  [] Patient is not progressing as expected and requires additional follow up with physician  [] Other    Prognosis for POC: [x] Good [] Fair  [] Poor      Patient requires continued skilled intervention: [x] Yes  [] No    Treatment/Activity Tolerance:  [x] Patient able to complete treatment  [] Patient limited by fatigue  [] Patient limited by pain     [] Patient limited by other medical complications  [x] Other:    6/28: Patient tolerated treatment well this session. He was able to perform exercises with no reports of pain. Fatigue present at end of session. Continue to focus on functional based dynamic activities. Continue to progress as tolerated. Patient education: Educated to continue to increase mobility and strengthening exercises. Encouraged to perform aerobic endurance. Prognosis: [x] Good [] Fair  [] Poor    Patient Requires Follow-up: [x] Yes  [] No    PLAN:   [x] Continue per plan of care [] Alter current plan (see comments)  [] Plan of care initiated [] Hold pending MD visit [] Discharge    Electronically signed by: David Woods PT, DPT      *If patient does not return for further follow ups after this date. Please consider this as the patients discharge from physical therapy.

## 2022-07-01 ENCOUNTER — HOSPITAL ENCOUNTER (OUTPATIENT)
Dept: PHYSICAL THERAPY | Age: 32
Setting detail: THERAPIES SERIES
Discharge: HOME | End: 2022-07-01
Payer: COMMERCIAL

## 2022-07-01 PROCEDURE — 97110 THERAPEUTIC EXERCISES: CPT

## 2022-07-01 PROCEDURE — 97530 THERAPEUTIC ACTIVITIES: CPT

## 2022-07-01 NOTE — FLOWSHEET NOTE
The [address] [place] [address],[address], [address], [address] [place] [address]  Phone: [phone]   Fax:     [phone]    Physical Therapy Daily Treatment Note  Date:  2022    Patient Name:  Eusebio Greco :  1990  MRN: [id number]  Restrictions/Precautions:    Medical/Treatment Diagnosis Information:  Diagnosis: S39.012D (ICD-10-CM) - Strain of lumbar region, subsequent encounter  Treatment Diagnosis: S39.012D (ICD-10-CM) - Strain of lumbar region, subsequent encounter  Insurance/Certification information:  PT Insurance Information: [id number]  Physician Information:  Referring Practitioner: Marcia Stuart PA-C  Has the plan of care been signed (Y/N):        []  Yes  [x]  No     Date of Patient follow up with Physician:       Is this a Progress Report:     []  Yes  [x]  No        If Yes:  Date Range for reporting period:  Beginning 22  Ending 22    Progress report will be due (10 Rx or 30 days whichever is less):   3/45/19      Recertification will be due (POC Duration  / 90 days whichever is less):  22        Visit # Insurance Allowable Auth Required    12 visits to 22 [x]  Yes []  No        Functional Scale: [place]; 48% deficit   Date assessed:  3/25/22     Latex Allergy:  [x]NO      []YES  Preferred Language for Healthcare:   [x]English       []other:      Pain level:  0/10        SUBJECTIVE:  Patient states that he has done a lot the last few days and is sore but doing okay. He states that he has been busy at work.          OBJECTIVE:   ROM     Comments   Trunk flexion To floor with minimal tightness (slight knee bend) Discomfort with return to stand   Trunk extension Extension occurs at hips and thoracic (<15 degs in lumbar)    Trunk R sidebend To knee     Trunk L sidebend WNL    Trunk R rotation WNL    Trunk L rotation WNL    HS flexibility                        Strength  Left Right Comments Hip flexion(L2) 5 5    Knee extension(L3) 5 5    Knee flexion(S1-2) 5 5    Ankle dorsiflexion(L4) 5 5    Toe extension(L5) 5 5    Ankle eversion/plantar flexion(S1) 5 5    Hip abd             Special tests 3/25   Comments   SLR      Slump test      Pelvic symmetry  equal     Segmental Spinal mobility      Heel walk negative     Toe walk negative     Tandem walk                 DTRs Left Right Comments   Patellar(L3-L4) = =     Achilles(S1-S2) = =                  Joint mobility: 6/7              []Normal               [x]Hypo mild hypo lumbar and thoracic PA               []Hyper     Palpation: mod TTP along  R upper lumbar  And lower thoracic paraspinals   6/7     Functional Mobility/Transfers: Independent with increased pain with ADL's; no  difficulty donning and doffing socks and shoes; able to perform modified duty at work; able to lift and carry light to moderate objects but challenged with heavy objects; resumed  work out but has not participated in boxing; able to ambulate for an extended period fo time; minimal difficulty sleeping   6/7     Posture: WNL  6/7     Gait: (include devices/WB status) No Antalgic gait present 6/7                          [x] Patient history, allergies, meds reviewed. Medical chart reviewed. See intake form. 6/7     Review Of Systems (ROS):   [x]Performed Review of systems (Integumentary, CardioPulmonary, Neurological) by intake and observation. Intake form has been scanned into medical record. Patient has been instructed to contact their primary care physician regarding ROS issues if not already being addressed at this time.   4/26     RESTRICTIONS/PRECAUTIONS:     Exercises/Interventions: BILATERAL  ROM/stretches/Mobility     SKTC 20 sec holds b/l x 1 Performed 6/13   DKTC 10 sec x 10    Child Pose w/ side bend  20x each  6/14   Prayer stretch to Cobra 2 X 10    Butterfly Stretch 3 x 30 secs    Thread the needles 2 X 15   b/l 6/14   Seated Lumbar Rotation w/ pigeon stretch 3 x verbal/tactile cueing for activities related to strengthening, flexibility, endurance, ROM  for improvements in proximal hip and core control with self care, mobility, lifting and ambulation. [x] (24863) Provided verbal/tactile cueing for activities related to improving balance, coordination, kinesthetic sense, posture, motor skill, proprioception  to assist with core control in self care, mobility, lifting, and ambulation. Therapeutic Activities:    [x] (39311 or 40453) Provided verbal/tactile cueing for activities related to improving balance, coordination, kinesthetic sense, posture, motor skill, proprioception and motor activation to allow for proper function  with self care and ADLs  [x] (89713) Provided training and instruction to the patient for proper core and proximal hip recruitment and positioning with ambulation re-education     Home Exercise Program:    [x] (44896) Reviewed/Progressed HEP activities related to strengthening, flexibility, endurance, ROM of core, proximal hip and LE for functional self-care, mobility, lifting and ambulation   [] (07553) Reviewed/Progressed HEP activities related to improving balance, coordination, kinesthetic sense, posture, motor skill, proprioception of core, proximal hip and LE for self care, mobility, lifting, and ambulation      Manual Treatments:  PROM / STM / Oscillations-Mobs:  G-I, II, III, IV (PA's, Inf., Post.)  [] (99192) Provided manual therapy to mobilize proximal hip and LS spine soft tissue/joints for the purpose of modulating pain, promoting relaxation,  increasing ROM, reducing/eliminating soft tissue swelling/inflammation/restriction, improving soft tissue extensibility and allowing for proper ROM for normal function with self care, mobility, lifting and ambulation.      Modalities:       Charges:  Timed Code Treatment Minutes: 55   Total Treatment Minutes: 3:15-4:20  65      3:15-3:45: 30 minutes 2 TE    3:45-4:10  25 min  2 TA    [] EVAL (LOW) 35226 (typically 20 minutes face-to-face)  [] EVAL (MOD) 37252 (typically 30 minutes face-to-face)  [] EVAL (HIGH) 06145 (typically 45 minutes face-to-face)  [] RE-EVAL     [x] MF(47190) x 2  [] IONTO  [] NMR (54033) x      [] VASO  [] Manual (23131) x     [] Other:  [x] TA x 2   [] Mech Traction (21733)  [] ES(attended) (77760)      [] ES (un) (69575):     Goals:   Patient stated goal: Return to work; Return to working out and boxing  [] Progressing: [] Met: [] Not Met: [] Adjusted    Therapist goals for Patient:   Short Term Goals: To be achieved in: 2 weeks  1. Independent in HEP and progression per patient tolerance, in order to prevent re-injury. [] Progressing: [x] Met: [] Not Met: [] Adjusted   2. Patient will have a decrease in pain to facilitate improvement in movement, function, and ADLs as indicated by Functional Deficits. [] Progressing: [x] Met: [] Not Met: [] Adjusted    Long Term Goals: To be achieved in: 4-6 weeks  1. Disability index score of 25% or less for the [place] to assist with reaching prior level of function. [] Progressing: [] Met: [] Not Met: [] Adjusted  2. Patient will demonstrate increased AROM to WNL, good LS mobility, good hip ROM to allow for proper joint functioning as indicated by patients Functional Deficits. [x] Progressing: [] Met: [] Not Met: [] Adjusted  3. Patient will demonstrate an increase in Strength to good proximal hip and core activation to allow for proper functional mobility as indicated by patients Functional Deficits. [x] Progressing: [] Met: [] Not Met: [] Adjusted  4. Patient will return to Independent functional activities without increased symptoms or restriction. [x] Progressing: [] Met: [] Not Met: [] Adjusted  5. Patient will report returning to gym with no restrictions.     [x] Progressing: [] Met: [] Not Met: [] Adjusted     Overall Progression Towards Functional goals/ Treatment Progress Update:  [x] Patient is progressing as expected towards functional goals listed. [] Progression is slowed due to complexities/Impairments listed. [] Progression has been slowed due to co-morbidities. [] Plan just implemented, too soon to assess goals progression <30days   [] Goals require adjustment due to lack of progress  [] Patient is not progressing as expected and requires additional follow up with physician  [] Other    Prognosis for POC: [x] Good [] Fair  [] Poor      Patient requires continued skilled intervention: [x] Yes  [] No    Treatment/Activity Tolerance:  [x] Patient able to complete treatment  [] Patient limited by fatigue  [] Patient limited by pain     [] Patient limited by other medical complications  [x] Other:    7/1: Patient tolerated treatment well this session. Presented to clinic with increased reports of stiffness and soreness but he was able to perform exercises with no reports of pain. Fatigue present at end of session. Continue to focus on functional based dynamic activities. Continue to progress as tolerated. Patient education: Educated to continue to increase mobility and strengthening exercises. Encouraged to perform aerobic endurance. Prognosis: [x] Good [] Fair  [] Poor    Patient Requires Follow-up: [x] Yes  [] No    PLAN:   [x] Continue per plan of care [] Alter current plan (see comments)  [] Plan of care initiated [] Hold pending MD visit [] Discharge    Electronically signed by: Jaleel Santiago PT, DPT      *If patient does not return for further follow ups after this date. Please consider this as the patients discharge from physical therapy.

## 2022-07-05 ENCOUNTER — HOSPITAL ENCOUNTER (OUTPATIENT)
Dept: PHYSICAL THERAPY | Age: 32
Setting detail: THERAPIES SERIES
Discharge: HOME | End: 2022-07-05
Payer: COMMERCIAL

## 2022-07-05 PROCEDURE — 97110 THERAPEUTIC EXERCISES: CPT

## 2022-07-05 PROCEDURE — 97530 THERAPEUTIC ACTIVITIES: CPT

## 2022-07-05 PROCEDURE — 97140 MANUAL THERAPY 1/> REGIONS: CPT

## 2022-07-05 NOTE — FLOWSHEET NOTE
The [address] [facility] and [facility], [address] [facility], [address] [place] [address]  Phone: [phone]   Fax:     [phone]    Physical Therapy Daily Treatment Note  Date:  2022    Patient Name:  Ray Callahan. :  1990  MRN: [id number]  Restrictions/Precautions:    Medical/Treatment Diagnosis Information:  Diagnosis: S39.012D (ICD-10-CM) - Strain of lumbar region, subsequent encounter  Treatment Diagnosis: S39.012D (ICD-10-CM) - Strain of lumbar region, subsequent encounter  Insurance/Certification information:  PT Insurance Information: [id number]  Physician Information:  Referring Practitioner: Molly De La Fuente PA-C  Has the plan of care been signed (Y/N):        []  Yes  [x]  No     Date of Patient follow up with Physician:       Is this a Progress Report:     []  Yes  [x]  No        If Yes:  Date Range for reporting period:  Beginning 22  Ending 22    Progress report will be due (10 Rx or 30 days whichever is less):         Recertification will be due (POC Duration  / 90 days whichever is less):  22        Visit # Insurance Allowable Auth Required    12 visits to 22 [x]  Yes []  No        Functional Scale: [place]; 48% deficit   Date assessed:  3/25/22     Latex Allergy:  [x]NO      []YES  Preferred Language for Healthcare:   [x]English       []other:      Pain level:  0/10        SUBJECTIVE:  Patient states that he has been doing a lot this weekend and is a little tender. He states that he feels stiff in the mornings.         OBJECTIVE:   ROM     Comments   Trunk flexion To floor with minimal tightness (slight knee bend) Discomfort with return to stand   Trunk extension Extension occurs at hips and thoracic (<15 degs in lumbar)    Trunk R sidebend To knee     Trunk L sidebend WNL    Trunk R rotation WNL    Trunk L rotation WNL    HS flexibility                        Strength  Left Right Comments   Hip flexion(L2) 5 5    Knee extension(L3) 5 5    Knee flexion(S1-2) 5 5    Ankle dorsiflexion(L4) 5 5    Toe extension(L5) 5 5    Ankle eversion/plantar flexion(S1) 5 5    Hip abd             Special tests 3/25   Comments   SLR      Slump test      Pelvic symmetry  equal     Segmental Spinal mobility      Heel walk negative     Toe walk negative     Tandem walk                 DTRs Left Right Comments   Patellar(L3-L4) = =     Achilles(S1-S2) = =                  Joint mobility: 6/7              []Normal               [x]Hypo mild hypo lumbar and thoracic PA               []Hyper     Palpation: mod TTP along  R upper lumbar  And lower thoracic paraspinals   6/7     Functional Mobility/Transfers: Independent with increased pain with ADL's; no  difficulty donning and doffing socks and shoes; able to perform modified duty at work; able to lift and carry light to moderate objects but challenged with heavy objects; resumed  work out but has not participated in boxing; able to ambulate for an extended period fo time; minimal difficulty sleeping   6/7     Posture: WNL  6/7     Gait: (include devices/WB status) No Antalgic gait present 6/7                          [x] Patient history, allergies, meds reviewed. Medical chart reviewed. See intake form. 6/7     Review Of Systems (ROS):   [x]Performed Review of systems (Integumentary, CardioPulmonary, Neurological) by intake and observation. Intake form has been scanned into medical record. Patient has been instructed to contact their primary care physician regarding ROS issues if not already being addressed at this time.   4/26     RESTRICTIONS/PRECAUTIONS:     Exercises/Interventions: BILATERAL  ROM/stretches/Mobility     SKTC 20 sec holds b/l x 1 Performed 6/13   DKTC 10 sec x 10    Child Pose w/ side bend  20x each  6/14   Prayer stretch to Cobra 2 X 10    Butterfly Stretch 3 x 30 secs    Thread the needles 2 X 15   b/l 6/14   Seated Lumbar Rotation w/ pigeon Provided verbal/tactile cueing for activities related to strengthening, flexibility, endurance, ROM  for improvements in proximal hip and core control with self care, mobility, lifting and ambulation. [x] (54326) Provided verbal/tactile cueing for activities related to improving balance, coordination, kinesthetic sense, posture, motor skill, proprioception  to assist with core control in self care, mobility, lifting, and ambulation. Therapeutic Activities:    [x] (45721 or 93488) Provided verbal/tactile cueing for activities related to improving balance, coordination, kinesthetic sense, posture, motor skill, proprioception and motor activation to allow for proper function  with self care and ADLs  [x] (40439) Provided training and instruction to the patient for proper core and proximal hip recruitment and positioning with ambulation re-education     Home Exercise Program:    [x] (80236) Reviewed/Progressed HEP activities related to strengthening, flexibility, endurance, ROM of core, proximal hip and LE for functional self-care, mobility, lifting and ambulation   [] (93987) Reviewed/Progressed HEP activities related to improving balance, coordination, kinesthetic sense, posture, motor skill, proprioception of core, proximal hip and LE for self care, mobility, lifting, and ambulation      Manual Treatments:  PROM / STM / Oscillations-Mobs:  G-I, II, III, IV (PA's, Inf., Post.)  [] (34392) Provided manual therapy to mobilize proximal hip and LS spine soft tissue/joints for the purpose of modulating pain, promoting relaxation,  increasing ROM, reducing/eliminating soft tissue swelling/inflammation/restriction, improving soft tissue extensibility and allowing for proper ROM for normal function with self care, mobility, lifting and ambulation.      Modalities:       Charges:  Timed Code Treatment Minutes: 50   Total Treatment Minutes: 3:19-4:20  59      3:20-3:35: 15 minutes TE  3:35-3:50 15 min  MAN  3:55-4:15  20 min   TA    [] EVAL (LOW) 00228 (typically 20 minutes face-to-face)  [] EVAL (MOD) 07588 (typically 30 minutes face-to-face)  [] EVAL (HIGH) 46533 (typically 45 minutes face-to-face)  [] RE-EVAL     [x] LG(29883) x 1  [] IONTO  [] NMR (71192) x      [] VASO  [x] Manual (48410) x 1     [] Other:  [x] TA x 1   [] Mech Traction (51869)  [] ES(attended) (48253)      [] ES (un) (07816):     Goals:   Patient stated goal: Return to work; Return to working out and boxing  [] Progressing: [] Met: [] Not Met: [] Adjusted    Therapist goals for Patient:   Short Term Goals: To be achieved in: 2 weeks  1. Independent in HEP and progression per patient tolerance, in order to prevent re-injury. [] Progressing: [x] Met: [] Not Met: [] Adjusted   2. Patient will have a decrease in pain to facilitate improvement in movement, function, and ADLs as indicated by Functional Deficits. [] Progressing: [x] Met: [] Not Met: [] Adjusted    Long Term Goals: To be achieved in: 4-6 weeks  1. Disability index score of 25% or less for the [place] to assist with reaching prior level of function. [] Progressing: [] Met: [] Not Met: [] Adjusted  2. Patient will demonstrate increased AROM to WNL, good LS mobility, good hip ROM to allow for proper joint functioning as indicated by patients Functional Deficits. [x] Progressing: [] Met: [] Not Met: [] Adjusted  3. Patient will demonstrate an increase in Strength to good proximal hip and core activation to allow for proper functional mobility as indicated by patients Functional Deficits. [x] Progressing: [] Met: [] Not Met: [] Adjusted  4. Patient will return to Independent functional activities without increased symptoms or restriction. [x] Progressing: [] Met: [] Not Met: [] Adjusted  5. Patient will report returning to gym with no restrictions.     [x] Progressing: [] Met: [] Not Met: [] Adjusted     Overall Progression Towards Functional goals/ Treatment Progress Update:  [x] Patient is progressing as expected towards functional goals listed. [] Progression is slowed due to complexities/Impairments listed. [] Progression has been slowed due to co-morbidities. [] Plan just implemented, too soon to assess goals progression <30days   [] Goals require adjustment due to lack of progress  [] Patient is not progressing as expected and requires additional follow up with physician  [] Other    Prognosis for POC: [x] Good [] Fair  [] Poor      Patient requires continued skilled intervention: [x] Yes  [] No    Treatment/Activity Tolerance:  [x] Patient able to complete treatment  [] Patient limited by fatigue  [] Patient limited by pain     [] Patient limited by other medical complications  [x] Other:    7/5: Patient tolerated treatment well this session. Presented to clinic with increased reports of stiffness and soreness but he was able to perform exercises with no reports of pain. Fatigue present at end of session. Increased muscular facilitation noted on R side in lumbar region. Continue to focus on functional based dynamic activities. Continue to progress as tolerated. Patient education: Educated to continue to increase mobility and strengthening exercises. Encouraged to perform aerobic endurance. Prognosis: [x] Good [] Fair  [] Poor    Patient Requires Follow-up: [x] Yes  [] No    PLAN:   [x] Continue per plan of care [] Alter current plan (see comments)  [] Plan of care initiated [] Hold pending MD visit [] Discharge    Electronically signed by: Shayla Paredes PT, DPT      *If patient does not return for further follow ups after this date. Please consider this as the patients discharge from physical therapy.

## 2022-07-07 ENCOUNTER — HOSPITAL ENCOUNTER (OUTPATIENT)
Dept: PHYSICAL THERAPY | Age: 32
Setting detail: THERAPIES SERIES
Discharge: HOME | End: 2022-07-07
Payer: COMMERCIAL

## 2022-07-07 PROCEDURE — 97530 THERAPEUTIC ACTIVITIES: CPT

## 2022-07-07 PROCEDURE — 97140 MANUAL THERAPY 1/> REGIONS: CPT

## 2022-07-07 PROCEDURE — 97110 THERAPEUTIC EXERCISES: CPT

## 2022-07-07 NOTE — DISCHARGE SUMMARY
If Yes:  Date Range for reporting period:  Beginning 6/7/22  Ending 6/24/22    Progress report will be due (10 Rx or 30 days whichever is less):   0/28/15      Recertification will be due (POC Duration  / 90 days whichever is less):  6/24/22        Visit # Insurance Allowable Auth Required   23 7/7 12 visits to 7/8/22 [x]  Yes []  No        Functional Scale: [place]; 48% deficit   Date assessed:  3/25/22     Latex Allergy:  [x]NO      []YES  Preferred Language for Healthcare:   [x]English       []other:      Pain level:  0/10    7/7    SUBJECTIVE: 7/7 Patient states that he went to the gym after his appointment and did puk-ups and felt okay. He states that he is sore today.        OBJECTIVE:   ROM  7/7   Comments   Trunk flexion To floor with minimal tightness (slight knee bend) Discomfort with return to stand   Trunk extension Extension occurs at hips and thoracic (<15 degs in lumbar)    Trunk R sidebend To knee     Trunk L sidebend WNL    Trunk R rotation WNL    Trunk L rotation WNL    HS flexibility                        Strength 7/7 Left Right Comments   Hip flexion(L2) 5 5    Knee extension(L3) 5 5    Knee flexion(S1-2) 5 5    Ankle dorsiflexion(L4) 5 5    Toe extension(L5) 5 5    Ankle eversion/plantar flexion(S1) 5 5    Hip abd             Special tests 7/7   Comments   SLR      Slump test      Pelvic symmetry  equal     Segmental Spinal mobility      Heel walk negative     Toe walk negative     Tandem walk                 DTRs Left Right Comments   Patellar(L3-L4) = =     Achilles(S1-S2) = =                  Joint mobility: 7/7              [x]Normal               []Hypo mild hypo lumbar and thoracic PA               []Hyper     Palpation: mod TTP along  R upper lumbar  And lower thoracic paraspinals  7/7     Functional Mobility/Transfers: Independent with increased pain with ADL's; able to perform modified duty at work; able to lift and carry light to moderate objects but challenged with heavy objects; resumed  work out but has not participated in boxing; able to ambulate for an extended period fo time; minimal difficulty sleeping   7/7     Posture: WNL  7/7     Gait: (include devices/WB status) No Antalgic gait present 7/7                          [x] Patient history, allergies, meds reviewed. Medical chart reviewed. See intake form. 6/7     Review Of Systems (ROS):   [x]Performed Review of systems (Integumentary, CardioPulmonary, Neurological) by intake and observation. Intake form has been scanned into medical record. Patient has been instructed to contact their primary care physician regarding ROS issues if not already being addressed at this time.   4/26     RESTRICTIONS/PRECAUTIONS:     Exercises/Interventions: BILATERAL  ROM/stretches/Mobility     SKTC 20 sec holds b/l x 1 Performed 6/13   DKTC 10 sec x 10    Child Pose w/ side bend  20x each  6/14   Prayer stretch to Cobra 2 X 10    Butterfly Stretch 3 x 30 secs    Thread the needles 2 X 15   b/l 6/14   Seated Lumbar Rotation w/ pigeon stretch 3 x 30 sec hold 6/14   Toe Touches  1 x 15    6/14   Fencer Stretch 4 x 30 sec 6/14   Quad stretch  3 x 30 sec (b/l) 6/14   Hamstring   3 x 30 seconds  b/l 6/14   Prone press ups (combo with STM and PA mob of lumbar) 1 x 10     Standing Calf stretch  5 x 30 sec    Toy solider's  2 lap    High knee pulls 2 laps    Cat and camel  2 X 15    Lateral walking hurdles  2 laps    Open/Close handley 2 laps    Alter G Treadmill Running Total 13 minutes (90% BW)  2 min run: 1 min walk  Range; 3.3 mph-5.5 mph    RDLs 3 x 10 (barbell) Performed 6/13   90/90 hip mobility 3 x 10  6/14   SL bridges w/leg raises 5x each      Strengthening     DB Dead lift to Shoulder Press 3 x 8 (25 lbs) 6/14   Shoulder Press 2 x 12 b/l 30 lbs     Leg Sled 4 laps (65 lbs) Performed 6/13   Leg Press 3 x 12 (220 lbs)   2 x 12 (120 lbs) SL Performed 6/13   Functional Dead lift with carries  (partnered)   155 lbs (barbhetal)  5  Dead lift then:  3 activities related to strengthening, flexibility, endurance, ROM of core, proximal hip and LE for functional self-care, mobility, lifting and ambulation   [] (64994) Reviewed/Progressed HEP activities related to improving balance, coordination, kinesthetic sense, posture, motor skill, proprioception of core, proximal hip and LE for self care, mobility, lifting, and ambulation      Manual Treatments:  PROM / STM / Oscillations-Mobs:  G-I, II, III, IV (PA's, Inf., Post.)  [x] (12971) Provided manual therapy to mobilize proximal hip and LS spine soft tissue/joints for the purpose of modulating pain, promoting relaxation,  increasing ROM, reducing/eliminating soft tissue swelling/inflammation/restriction, improving soft tissue extensibility and allowing for proper ROM for normal function with self care, mobility, lifting and ambulation. Modalities:       Charges:  Timed Code Treatment Minutes: 50   Total Treatment Minutes: 3:19-4:20  59      3:20-3:35: 15 minutes TE  3:35-3:50 15 min  MAN  3:55-4:15  20 min   TA    [] EVAL (LOW) 62541 (typically 20 minutes face-to-face)  [] EVAL (MOD) 11735 (typically 30 minutes face-to-face)  [] EVAL (HIGH) 70802 (typically 45 minutes face-to-face)  [] RE-EVAL     [x] BG(85695) x 1  [] IONTO  [] NMR (37703) x      [] VASO  [x] Manual (48906) x 1     [] Other:  [x] TA x 1   [] Mech Traction (81273)  [] ES(attended) (90166)      [] ES (un) (66252):     Goals:   Patient stated goal: Return to work; Return to working out and boxing  [] Progressing: [] Met: [] Not Met: [] Adjusted    Therapist goals for Patient:   Short Term Goals: To be achieved in: 2 weeks  1. Independent in HEP and progression per patient tolerance, in order to prevent re-injury. [] Progressing: [x] Met: [] Not Met: [] Adjusted   2. Patient will have a decrease in pain to facilitate improvement in movement, function, and ADLs as indicated by Functional Deficits.     [] Progressing: [x] Met: [] Not Met: [] Adjusted    Long Term Goals: To be achieved in: 4-6 weeks  1. Disability index score of 25% or less for the [place] to assist with reaching prior level of function. [x] Progressing: [] Met: [] Not Met: [] Adjusted  2. Patient will demonstrate increased AROM to WNL, good LS mobility, good hip ROM to allow for proper joint functioning as indicated by patients Functional Deficits. [] Progressing: [x] Met: [] Not Met: [] Adjusted  3. Patient will demonstrate an increase in Strength to good proximal hip and core activation to allow for proper functional mobility as indicated by patients Functional Deficits. [] Progressing: [x] Met: [] Not Met: [] Adjusted  4. Patient will return to Independent functional activities without increased symptoms or restriction. [x] Progressing: [] Met: [] Not Met: [] Adjusted  5. Patient will report returning to gym with no restrictions. [x] Progressing: [] Met: [] Not Met: [] Adjusted     Overall Progression Towards Functional goals/ Treatment Progress Update:  [x] Patient is progressing as expected towards functional goals listed. [] Progression is slowed due to complexities/Impairments listed. [] Progression has been slowed due to co-morbidities. [] Plan just implemented, too soon to assess goals progression <30days   [] Goals require adjustment due to lack of progress  [] Patient is not progressing as expected and requires additional follow up with physician  [] Other    Prognosis for POC: [x] Good [] Fair  [] Poor      Patient requires continued skilled intervention: [x] Yes  [] No    Treatment/Activity Tolerance:  [x] Patient able to complete treatment  [] Patient limited by fatigue  [] Patient limited by pain     [] Patient limited by other medical complications  [x] Other:    7/7 Patient has progressed well in physical therapy.  He continues to display tightness in lumbar region and reports \"catching\" in low back with lifting from floor height to waist. Patient has

## 2023-10-16 ENCOUNTER — TELEPHONE (OUTPATIENT)
Dept: FAMILY MEDICINE CLINIC | Age: 33
End: 2023-10-16

## 2024-01-04 ENCOUNTER — NURSE TRIAGE (OUTPATIENT)
Dept: OTHER | Facility: CLINIC | Age: 34
End: 2024-01-04

## 2024-01-04 ENCOUNTER — OFFICE VISIT (OUTPATIENT)
Dept: ORTHOPEDIC SURGERY | Age: 34
End: 2024-01-04
Payer: COMMERCIAL

## 2024-01-04 VITALS — BODY MASS INDEX: 27.11 KG/M2 | WEIGHT: 183 LBS | RESPIRATION RATE: 16 BRPM | HEIGHT: 69 IN

## 2024-01-04 DIAGNOSIS — M25.512 LEFT SHOULDER PAIN, UNSPECIFIED CHRONICITY: Primary | ICD-10-CM

## 2024-01-04 DIAGNOSIS — M75.22 BICEPS TENDONITIS ON LEFT: ICD-10-CM

## 2024-01-04 PROCEDURE — 99213 OFFICE O/P EST LOW 20 MIN: CPT | Performed by: PHYSICIAN ASSISTANT

## 2024-01-04 RX ORDER — MELOXICAM 15 MG/1
15 TABLET ORAL DAILY
Qty: 30 TABLET | Refills: 0 | Status: SHIPPED | OUTPATIENT
Start: 2024-01-04

## 2024-01-04 NOTE — TELEPHONE ENCOUNTER
Location of patient: [place]    Received call from [facility] at [facility]; Patient with Red Flag Complaint requesting to establish care with [place].    Subjective: Caller states \"Left shoulder pain, I did have surgery on this shoulder about 12 years ago. This pain started 3 days ago \"     Current Symptoms: Shoulder pain around front deltoid- Worse in the am, seems to subside throughout the day  Denies new trauma and injury   Onset: 3 days ago; gradual    Associated Symptoms: reduced activity    Pain Severity: 6/10; aching, throbbing; waxing and waning  Worse when waking up  Temperature: no fevers     What has been tried: ibuprofen/tylenol some relief with tylenol        Recommended disposition: See PCP within 3 Days  Alternate dispo given of ED or   Care advice provided, patient verbalizes understanding; denies any other questions or concerns; instructed to call back for any new or worsening symptoms.    Patient/Caller agrees with recommended disposition; writer provided warm transfer to [facility] at [facility] for appointment scheduling    Attention Provider:  Thank you for allowing me to participate in the care of your patient.  The patient was connected to triage in response to information provided to the [facility].  Please do not respond through this encounter as the response is not directed to a shared pool.    Reason for Disposition   MODERATE pain (e.g., interferes with normal activities) and present > 3 days    Protocols used: Shoulder Pain-ADULT-[place]

## 2024-01-04 NOTE — PROGRESS NOTES
Subjective:      Patient ID: Shankar Coombs Jr. is a 33 y.o. male who presents to the office for an initial evaluation of left anterior shoulder pain for the past 3 days.  He does not recall a specific injury.  He does a fair amount of weight lifting and pull-ups.  These activities aggravate his left shoulder pain.  He does have a history of a previous left shoulder labral repair performed 12 years ago.  He denies any issues since recovery from that left shoulder surgery.      Pain Scale 6/10 VAS.  Location of pain left biceps tendon.  Pain is worse with activity.   Pain improves with rest.   Previous treatments have included occasional ibuprofen..     Review of Systems:  I have reviewed the clinically relevant past medical history, medications, allergies, family history, social history, and 13 point Review of Systems from the patient's recent history form & documented any details relevant to today's presenting complaints in the history above. The patient's self-reported past medical history, medications, allergies, family history, social history, and Review of Systems form from today's date have been scanned into the chart under the \"Media\" tab.    As noted in the HPI.  Negative for fever or chills.   Negative for poly-joint pain, swelling and stiffness.  Negative for numbness or tingling.     Past Medical History:   Diagnosis Date    Gynecomastia        Family History   Problem Relation Age of Onset    No Known Problems Mother     No Known Problems Brother     Cancer Maternal Grandfather         lung    Arthritis Paternal Grandfather        Past Surgical History:   Procedure Laterality Date    FINGER SURGERY Right 02/13/2017    closed reduction and percutaneous pinning right thumb fracture    FOOT SURGERY Right     KNEE SURGERY Left 03/2017    SHOULDER SURGERY Left     SKIN CANCER EXCISION      back; non-melanoma       Social History     Occupational History    Occupation:     Occupation: [employer]

## 2024-01-16 ENCOUNTER — APPOINTMENT (OUTPATIENT)
Dept: GENERAL RADIOLOGY | Age: 34
End: 2024-01-16
Payer: COMMERCIAL

## 2024-01-16 ENCOUNTER — HOSPITAL ENCOUNTER (EMERGENCY)
Age: 34
Discharge: HOME | End: 2024-01-16
Payer: COMMERCIAL

## 2024-01-16 VITALS
OXYGEN SATURATION: 98 % | WEIGHT: 183 LBS | BODY MASS INDEX: 27.11 KG/M2 | HEART RATE: 111 BPM | DIASTOLIC BLOOD PRESSURE: 83 MMHG | HEIGHT: 69 IN | RESPIRATION RATE: 16 BRPM | TEMPERATURE: 99.6 F | SYSTOLIC BLOOD PRESSURE: 148 MMHG

## 2024-01-16 DIAGNOSIS — S61.432A PUNCTURE WOUND OF LEFT HAND WITHOUT FOREIGN BODY, INITIAL ENCOUNTER: ICD-10-CM

## 2024-01-16 DIAGNOSIS — J02.0 STREP PHARYNGITIS: Primary | ICD-10-CM

## 2024-01-16 DIAGNOSIS — R11.2 NAUSEA AND VOMITING, UNSPECIFIED VOMITING TYPE: ICD-10-CM

## 2024-01-16 LAB
FLUAV RNA RESP QL NAA+PROBE: NOT DETECTED
FLUBV RNA RESP QL NAA+PROBE: NOT DETECTED
S PYO AG THROAT QL: POSITIVE
SARS-COV-2 RNA RESP QL NAA+PROBE: NOT DETECTED

## 2024-01-16 PROCEDURE — 87880 STREP A ASSAY W/OPTIC: CPT

## 2024-01-16 PROCEDURE — 73140 X-RAY EXAM OF FINGER(S): CPT

## 2024-01-16 PROCEDURE — 87636 SARSCOV2 & INF A&B AMP PRB: CPT

## 2024-01-16 PROCEDURE — 6370000000 HC RX 637 (ALT 250 FOR IP): Performed by: PHYSICIAN ASSISTANT

## 2024-01-16 PROCEDURE — 99284 EMERGENCY DEPT VISIT MOD MDM: CPT

## 2024-01-16 RX ORDER — ONDANSETRON 4 MG/1
4 TABLET, FILM COATED ORAL EVERY 8 HOURS PRN
Qty: 20 TABLET | Refills: 0 | Status: SHIPPED | OUTPATIENT
Start: 2024-01-16

## 2024-01-16 RX ORDER — PENICILLIN V POTASSIUM 500 MG/1
500 TABLET ORAL 3 TIMES DAILY
Qty: 30 TABLET | Refills: 0 | Status: SHIPPED | OUTPATIENT
Start: 2024-01-16 | End: 2024-01-26

## 2024-01-16 RX ORDER — IBUPROFEN 800 MG/1
800 TABLET ORAL EVERY 8 HOURS PRN
Qty: 30 TABLET | Refills: 0 | Status: SHIPPED | OUTPATIENT
Start: 2024-01-16

## 2024-01-16 RX ORDER — ONDANSETRON 8 MG/1
8 TABLET, ORALLY DISINTEGRATING ORAL ONCE
Status: COMPLETED | OUTPATIENT
Start: 2024-01-16 | End: 2024-01-16

## 2024-01-16 RX ORDER — IBUPROFEN 800 MG/1
800 TABLET ORAL ONCE
Status: COMPLETED | OUTPATIENT
Start: 2024-01-16 | End: 2024-01-16

## 2024-01-16 RX ADMIN — IBUPROFEN 800 MG: 800 TABLET, FILM COATED ORAL at 16:58

## 2024-01-16 RX ADMIN — ONDANSETRON 8 MG: 8 TABLET, ORALLY DISINTEGRATING ORAL at 16:58

## 2024-01-16 ASSESSMENT — PAIN DESCRIPTION - LOCATION: LOCATION: HAND;BACK

## 2024-01-16 ASSESSMENT — ENCOUNTER SYMPTOMS
SORE THROAT: 1
DIARRHEA: 0
NAUSEA: 1
SHORTNESS OF BREATH: 0
VOMITING: 1
COUGH: 0
CHEST TIGHTNESS: 0
ABDOMINAL PAIN: 0

## 2024-01-16 ASSESSMENT — LIFESTYLE VARIABLES
HOW OFTEN DO YOU HAVE A DRINK CONTAINING ALCOHOL: NEVER
HOW MANY STANDARD DRINKS CONTAINING ALCOHOL DO YOU HAVE ON A TYPICAL DAY: PATIENT DOES NOT DRINK

## 2024-01-16 ASSESSMENT — PAIN DESCRIPTION - ORIENTATION: ORIENTATION: LEFT

## 2024-01-16 ASSESSMENT — PAIN DESCRIPTION - PAIN TYPE: TYPE: ACUTE PAIN

## 2024-01-16 ASSESSMENT — PAIN - FUNCTIONAL ASSESSMENT: PAIN_FUNCTIONAL_ASSESSMENT: 0-10

## 2024-01-16 ASSESSMENT — PAIN SCALES - GENERAL
PAINLEVEL_OUTOF10: 10
PAINLEVEL_OUTOF10: 10

## 2024-01-16 NOTE — ED PROVIDER NOTES
[facility] EMERGENCY DEPARTMENT  EMERGENCY DEPARTMENT ENCOUNTER        Pt Name: Shankar Coombs Jr.  MRN: [id number]  Birthdate 1990  Date of evaluation: 1/16/2024  Provider: Ramirez Sandoval PA-C  PCP: No primary care provider on file.  Note Started: 5:03 PM EST 1/16/24      ANGELIA. I have evaluated this patient.        CHIEF COMPLAINT       Chief Complaint   Patient presents with    Emesis     Pt w c/o emesis that  started in the morning. Pt denied diarrhea but stated chills. Pt also c/o wound on his L hand       HISTORY OF PRESENT ILLNESS: 1 or more Elements     History from : Patient    Limitations to history : None    Shankar Coombs Jr. is a 33 y.o. male who presents to the emergency department today stating since this morning he has had fevers, chills, headache, body aches, sore throat as well as nausea with vomiting x 2.  Patient denies his headache being the worst headache of his life.  He denies acute onset or thunderclap headache.  He denies chest pain, shortness of breath or coughing.  He denies abdominal pain.  His temperature is 99.6 °F on arrival.    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  He states he thought he removed it however is still having mild pain in this area.  He denies redness, warmth or drainage.        Nursing Notes were all reviewed and agreed with or any disagreements were addressed in the HPI.    REVIEW OF SYSTEMS :      Review of Systems   Constitutional:  Positive for chills and fever.   HENT:  Positive for sore throat.    Respiratory:  Negative for cough, chest tightness and shortness of breath.    Cardiovascular:  Negative for chest pain and palpitations.   Gastrointestinal:  Positive for nausea and vomiting. Negative for abdominal pain and diarrhea.   Genitourinary:  Negative for difficulty urinating, dysuria, flank pain, frequency and hematuria.   Skin:  Positive for wound.

## 2024-01-16 NOTE — ED NOTES
Patient oriented to [facility] room and ED throughput process.  Safety measures with ED bed locked in lowest position and call light in reach.  Patient educated on all orders, including any medications.  Patient educated on chief complaint/symptoms. Patient encouraged to ask questions regarding care, medications or treatment plan.  Patient aware of how to reach staff with questions/concerns.   Benzoyl Peroxide Pregnancy And Lactation Text: This medication is Pregnancy Category C. It is unknown if benzoyl peroxide is excreted in breast milk.